# Patient Record
Sex: MALE | Race: WHITE | NOT HISPANIC OR LATINO | Employment: OTHER | URBAN - METROPOLITAN AREA
[De-identification: names, ages, dates, MRNs, and addresses within clinical notes are randomized per-mention and may not be internally consistent; named-entity substitution may affect disease eponyms.]

---

## 2023-05-09 ENCOUNTER — OFFICE VISIT (OUTPATIENT)
Dept: PRIMARY CARE | Facility: CLINIC | Age: 88
End: 2023-05-09
Payer: MEDICARE

## 2023-05-09 VITALS
BODY MASS INDEX: 30.18 KG/M2 | WEIGHT: 195.6 LBS | DIASTOLIC BLOOD PRESSURE: 60 MMHG | SYSTOLIC BLOOD PRESSURE: 118 MMHG | TEMPERATURE: 97.5 F

## 2023-05-09 DIAGNOSIS — I10 PRIMARY HYPERTENSION: ICD-10-CM

## 2023-05-09 DIAGNOSIS — I50.9 CHRONIC CONGESTIVE HEART FAILURE, UNSPECIFIED HEART FAILURE TYPE (MULTI): ICD-10-CM

## 2023-05-09 DIAGNOSIS — F33.42 RECURRENT MAJOR DEPRESSIVE DISORDER, IN FULL REMISSION (CMS-HCC): ICD-10-CM

## 2023-05-09 DIAGNOSIS — K21.9 GASTROESOPHAGEAL REFLUX DISEASE, UNSPECIFIED WHETHER ESOPHAGITIS PRESENT: ICD-10-CM

## 2023-05-09 DIAGNOSIS — I73.9 PAD (PERIPHERAL ARTERY DISEASE) (CMS-HCC): ICD-10-CM

## 2023-05-09 DIAGNOSIS — N18.31 STAGE 3A CHRONIC KIDNEY DISEASE (MULTI): ICD-10-CM

## 2023-05-09 DIAGNOSIS — M35.01 KERATITIS SICCA, BILATERAL (MULTI): ICD-10-CM

## 2023-05-09 DIAGNOSIS — J44.9: ICD-10-CM

## 2023-05-09 DIAGNOSIS — E11.59 CONTROLLED TYPE 2 DIABETES MELLITUS WITH OTHER CIRCULATORY COMPLICATION, WITHOUT LONG-TERM CURRENT USE OF INSULIN (MULTI): ICD-10-CM

## 2023-05-09 DIAGNOSIS — E11.9 TYPE 2 DIABETES MELLITUS WITHOUT COMPLICATION, WITHOUT LONG-TERM CURRENT USE OF INSULIN (MULTI): Primary | ICD-10-CM

## 2023-05-09 DIAGNOSIS — G95.9 CERVICAL MYELOPATHY (MULTI): ICD-10-CM

## 2023-05-09 PROBLEM — I35.0 NONRHEUMATIC AORTIC VALVE STENOSIS: Status: ACTIVE | Noted: 2023-05-09

## 2023-05-09 PROBLEM — F33.9 RECURRENT MAJOR DEPRESSIVE DISORDER (CMS-HCC): Status: ACTIVE | Noted: 2023-05-09

## 2023-05-09 PROBLEM — J41.8 MIXED SIMPLE AND MUCOPURULENT CHRONIC BRONCHITIS (MULTI): Status: ACTIVE | Noted: 2018-05-03

## 2023-05-09 PROBLEM — H16.223 KERATITIS SICCA, BILATERAL: Status: ACTIVE | Noted: 2023-05-09

## 2023-05-09 PROBLEM — N40.0 BENIGN PROSTATIC HYPERPLASIA: Status: ACTIVE | Noted: 2023-05-09

## 2023-05-09 PROBLEM — I65.23 BILATERAL CAROTID ARTERY STENOSIS: Status: ACTIVE | Noted: 2023-05-09

## 2023-05-09 PROBLEM — J45.909 REACTIVE AIRWAY DISEASE (HHS-HCC): Status: ACTIVE | Noted: 2023-05-09

## 2023-05-09 PROBLEM — D64.9 ANEMIA: Status: ACTIVE | Noted: 2023-05-09

## 2023-05-09 PROBLEM — N18.30 CKD (CHRONIC KIDNEY DISEASE) STAGE 3, GFR 30-59 ML/MIN (MULTI): Status: ACTIVE | Noted: 2023-05-09

## 2023-05-09 PROBLEM — M54.81 BILATERAL OCCIPITAL NEURALGIA: Status: ACTIVE | Noted: 2023-05-09

## 2023-05-09 PROCEDURE — 3078F DIAST BP <80 MM HG: CPT | Performed by: INTERNAL MEDICINE

## 2023-05-09 PROCEDURE — 3074F SYST BP LT 130 MM HG: CPT | Performed by: INTERNAL MEDICINE

## 2023-05-09 PROCEDURE — 99214 OFFICE O/P EST MOD 30 MIN: CPT | Performed by: INTERNAL MEDICINE

## 2023-05-09 PROCEDURE — 1157F ADVNC CARE PLAN IN RCRD: CPT | Performed by: INTERNAL MEDICINE

## 2023-05-09 PROCEDURE — 1036F TOBACCO NON-USER: CPT | Performed by: INTERNAL MEDICINE

## 2023-05-09 PROCEDURE — 1159F MED LIST DOCD IN RCRD: CPT | Performed by: INTERNAL MEDICINE

## 2023-05-09 RX ORDER — FAMOTIDINE 20 MG/1
20 TABLET, FILM COATED ORAL 2 TIMES DAILY
COMMUNITY
End: 2023-05-09 | Stop reason: SDUPTHER

## 2023-05-09 RX ORDER — SPIRONOLACTONE 25 MG/1
25 TABLET ORAL DAILY
COMMUNITY
End: 2023-05-09 | Stop reason: SDUPTHER

## 2023-05-09 RX ORDER — METOPROLOL TARTRATE 25 MG/1
1 TABLET, FILM COATED ORAL EVERY 12 HOURS
COMMUNITY
Start: 2019-04-24 | End: 2024-03-15

## 2023-05-09 RX ORDER — SPIRONOLACTONE 25 MG/1
25 TABLET ORAL DAILY
Qty: 90 TABLET | Refills: 3 | Status: SHIPPED | OUTPATIENT
Start: 2023-05-09 | End: 2024-03-19 | Stop reason: SDUPTHER

## 2023-05-09 RX ORDER — ESCITALOPRAM OXALATE 10 MG/1
10 TABLET ORAL DAILY
COMMUNITY
End: 2023-05-09 | Stop reason: SDUPTHER

## 2023-05-09 RX ORDER — ACETAMINOPHEN 325 MG/1
1-2 TABLET ORAL EVERY 6 HOURS
COMMUNITY
Start: 2019-11-09

## 2023-05-09 RX ORDER — ATORVASTATIN CALCIUM 80 MG/1
80 TABLET, FILM COATED ORAL NIGHTLY
COMMUNITY
End: 2023-05-09 | Stop reason: SDUPTHER

## 2023-05-09 RX ORDER — FAMOTIDINE 20 MG/1
20 TABLET, FILM COATED ORAL 2 TIMES DAILY
Qty: 180 TABLET | Refills: 3 | Status: SHIPPED | OUTPATIENT
Start: 2023-05-09 | End: 2024-02-06 | Stop reason: SDUPTHER

## 2023-05-09 RX ORDER — ALBUTEROL SULFATE 90 UG/1
2 AEROSOL, METERED RESPIRATORY (INHALATION) EVERY 4 HOURS PRN
COMMUNITY
Start: 2018-05-08 | End: 2024-04-02 | Stop reason: ALTCHOICE

## 2023-05-09 RX ORDER — GABAPENTIN 300 MG/1
300 CAPSULE ORAL 2 TIMES DAILY
Qty: 180 CAPSULE | Refills: 3 | Status: SHIPPED | OUTPATIENT
Start: 2023-05-09 | End: 2023-10-03 | Stop reason: SDUPTHER

## 2023-05-09 RX ORDER — ESCITALOPRAM OXALATE 10 MG/1
10 TABLET ORAL DAILY
Qty: 90 TABLET | Refills: 3 | Status: SHIPPED | OUTPATIENT
Start: 2023-05-09 | End: 2023-10-03 | Stop reason: SDUPTHER

## 2023-05-09 RX ORDER — FUROSEMIDE 40 MG/1
40 TABLET ORAL
COMMUNITY

## 2023-05-09 RX ORDER — GUAIFENESIN, PSEUDOEPHEDRINE HYDROCHLORIDE 600; 60 MG/1; MG/1
TABLET, EXTENDED RELEASE ORAL EVERY 12 HOURS
COMMUNITY
Start: 2020-09-01

## 2023-05-09 RX ORDER — MONTELUKAST SODIUM 10 MG/1
10 TABLET ORAL DAILY
COMMUNITY

## 2023-05-09 RX ORDER — ISOSORBIDE MONONITRATE 60 MG/1
1 TABLET, EXTENDED RELEASE ORAL
COMMUNITY
Start: 2019-06-14 | End: 2024-03-15

## 2023-05-09 RX ORDER — ALBUTEROL SULFATE 0.83 MG/ML
2.5 SOLUTION RESPIRATORY (INHALATION) DAILY
COMMUNITY
End: 2024-04-02 | Stop reason: ALTCHOICE

## 2023-05-09 RX ORDER — ATORVASTATIN CALCIUM 80 MG/1
80 TABLET, FILM COATED ORAL NIGHTLY
Qty: 90 TABLET | Refills: 3 | Status: SHIPPED | OUTPATIENT
Start: 2023-05-09 | End: 2024-03-19 | Stop reason: SDUPTHER

## 2023-05-09 RX ORDER — GABAPENTIN 300 MG/1
300 CAPSULE ORAL 2 TIMES DAILY
COMMUNITY
End: 2023-05-09 | Stop reason: SDUPTHER

## 2023-05-09 RX ORDER — ASPIRIN 81 MG/1
1 TABLET ORAL DAILY
COMMUNITY

## 2023-05-09 RX ORDER — TIOTROPIUM BROMIDE 18 UG/1
CAPSULE ORAL; RESPIRATORY (INHALATION)
COMMUNITY

## 2023-05-09 ASSESSMENT — PATIENT HEALTH QUESTIONNAIRE - PHQ9
SUM OF ALL RESPONSES TO PHQ9 QUESTIONS 1 AND 2: 2
2. FEELING DOWN, DEPRESSED OR HOPELESS: SEVERAL DAYS
1. LITTLE INTEREST OR PLEASURE IN DOING THINGS: SEVERAL DAYS

## 2023-05-09 NOTE — PROGRESS NOTES
"Subjective   Patient ID: Gregory Tai is a 88 y.o. male who presents for Follow-up.    HPI   Overall well   #1 lt arm pain- minimal now  #2 DM- diet \"ok\"  #3 BPH- ok UO  #4 CAD- no CP, recently saw cards  #5 COPD/chronic bronchitis/LARON-  #6 depression- good      Review of Systems   All other systems reviewed and are negative.      Objective   /60   Temp 36.4 °C (97.5 °F)   Wt 88.7 kg (195 lb 9.6 oz)   BMI 30.18 kg/m²     Physical Exam  Constitutional:       General: He is not in acute distress.     Appearance: Normal appearance. He is not ill-appearing or toxic-appearing.   Cardiovascular:      Rate and Rhythm: Normal rate and regular rhythm.      Heart sounds: No murmur heard.  Pulmonary:      Effort: Pulmonary effort is normal.      Breath sounds: Normal breath sounds.   Abdominal:      General: Abdomen is flat.      Palpations: Abdomen is soft. There is no mass.      Tenderness: There is no abdominal tenderness.   Neurological:      Mental Status: He is alert and oriented to person, place, and time.   Psychiatric:         Mood and Affect: Mood normal.         Thought Content: Thought content normal.         Judgment: Judgment normal.           Lab Results   Component Value Date    WBC 6.6 11/14/2022    HGB 12.6 (L) 11/14/2022    HCT 40.6 (L) 11/14/2022     11/14/2022    CHOL 102 05/13/2022    TRIG 50 05/13/2022    HDL 46.1 05/13/2022    ALT 23 05/13/2022    AST 22 03/15/2022     11/14/2022    K 4.3 11/14/2022     11/14/2022    CREATININE 1.27 11/14/2022    BUN 28 (H) 11/14/2022    CO2 33 (H) 11/14/2022    TSH 1.11 11/14/2022    INR 1.0 11/06/2019    HGBA1C 6.5 (A) 01/18/2023     par  Assessment/Plan       #1 lt arm pain/ lt biceps tendon rupture- resolved. f/u ortho prn  #2 DM- stable. Spent significant time reviewing low sugar, reduced carbohydrate diet with exercise. retest, will hold off rx as a1c <7.5 (goal)  #3 BPH- stable. Followup urology  #4 CAD-stable class I. Follow-up " cardiology   #5 COPD/chronic bronchitis/LARON- remains an issue. f/u pulm ([facility]) /infectious disease ([facility])--> appt pending today.   #6 depression- stable. will try to reduce lexapro, he declines.   #7 hyperlipidemia- retest 3 mths  #8 carotid disease - f/u vasc surg [facility]  #9 pulmonary nodule- stable x2 yrs. f/u CT w/ pulm ([facility])  #10 abd wall pain- resolved.  #11 htn- good. 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 f/u surgery.  #13 anemia/mild thrombocytopenia- stable/mild. s/p heme eval--> iron def. Off iron for now.   heme states no follow-up needed except follow-up lab works. retest   #14 cough- stable, but remains a major issue, perhaps a little better. Apparently chronic bronchitis. Follow pulmonology  #15 headache- stable. f/u neuro  #16 back pain/neuropathic pain- s/p epidurals. f/u neuro. strongly rec PT.  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  #19 GB dz- doing well  #20 esophageal spasm/web- Follow-up GI  #21 CTS- reviewed. f/u hand ortho.   #22 CKD- f/u Dr Lucio (neph).   #23 ? sinus issue/smell - resolved  #24 hyponatremia- apparently due to Trileptal plus likely combination of pulmonary disease/CHF. follow-up w/ dr lucio.  #25 pneumonia- resolved. f/u pulm  #26 CHF w/ preserved EF- stable. daily wt's. f/u cards.  #27 CTS- surg pending (rt)  .  rec COVID vaccine ASAP, stressed importance

## 2023-06-30 LAB
ALBUMIN (G/DL) IN SER/PLAS: 3.8 G/DL (ref 3.4–5)
ALBUMIN (MG/L) IN URINE: <7 MG/L
ALBUMIN/CREATININE (UG/MG) IN URINE: NORMAL UG/MG CRT (ref 0–30)
ANION GAP IN SER/PLAS: 10 MMOL/L (ref 10–20)
APPEARANCE, URINE: ABNORMAL
BILIRUBIN, URINE: NEGATIVE
BLOOD, URINE: NEGATIVE
CALCIUM (MG/DL) IN SER/PLAS: 9 MG/DL (ref 8.6–10.3)
CARBON DIOXIDE, TOTAL (MMOL/L) IN SER/PLAS: 31 MMOL/L (ref 21–32)
CHLORIDE (MMOL/L) IN SER/PLAS: 105 MMOL/L (ref 98–107)
COLOR, URINE: YELLOW
CREATININE (MG/DL) IN SER/PLAS: 1.3 MG/DL (ref 0.5–1.3)
CREATININE (MG/DL) IN URINE: 66.9 MG/DL (ref 20–370)
GFR MALE: 53 ML/MIN/1.73M2
GLUCOSE (MG/DL) IN SER/PLAS: 97 MG/DL (ref 74–99)
GLUCOSE, URINE: NEGATIVE MG/DL
HYALINE CASTS, URINE: ABNORMAL /LPF
KETONES, URINE: NEGATIVE MG/DL
LEUKOCYTE ESTERASE, URINE: ABNORMAL
MUCUS, URINE: ABNORMAL /LPF
NITRITE, URINE: NEGATIVE
PH, URINE: 5 (ref 5–8)
PHOSPHATE (MG/DL) IN SER/PLAS: 3.5 MG/DL (ref 2.5–4.9)
POTASSIUM (MMOL/L) IN SER/PLAS: 4 MMOL/L (ref 3.5–5.3)
PROTEIN, URINE: NEGATIVE MG/DL
RBC, URINE: 1 /HPF (ref 0–5)
SODIUM (MMOL/L) IN SER/PLAS: 142 MMOL/L (ref 136–145)
SPECIFIC GRAVITY, URINE: 1.01 (ref 1–1.03)
UREA NITROGEN (MG/DL) IN SER/PLAS: 33 MG/DL (ref 6–23)
UROBILINOGEN, URINE: <2 MG/DL (ref 0–1.9)
WBC CLUMPS, URINE: ABNORMAL /HPF
WBC, URINE: 80 /HPF (ref 0–5)

## 2023-07-27 LAB
ALBUMIN (G/DL) IN SER/PLAS: 3.7 G/DL (ref 3.4–5)
ALBUMIN (MG/L) IN URINE: 7.6 MG/L
ALBUMIN/CREATININE (UG/MG) IN URINE: 7.9 UG/MG CRT (ref 0–30)
ANION GAP IN SER/PLAS: 10 MMOL/L (ref 10–20)
APPEARANCE, URINE: ABNORMAL
BACTERIA, URINE: ABNORMAL /HPF
BILIRUBIN, URINE: NEGATIVE
BLOOD, URINE: NEGATIVE
CALCIDIOL (25 OH VITAMIN D3) (NG/ML) IN SER/PLAS: 48 NG/ML
CALCIUM (MG/DL) IN SER/PLAS: 8.9 MG/DL (ref 8.6–10.3)
CARBON DIOXIDE, TOTAL (MMOL/L) IN SER/PLAS: 31 MMOL/L (ref 21–32)
CHLORIDE (MMOL/L) IN SER/PLAS: 103 MMOL/L (ref 98–107)
COLOR, URINE: ABNORMAL
CREATININE (MG/DL) IN SER/PLAS: 1.36 MG/DL (ref 0.5–1.3)
CREATININE (MG/DL) IN URINE: 96.8 MG/DL (ref 20–370)
ERYTHROCYTE DISTRIBUTION WIDTH (RATIO) BY AUTOMATED COUNT: 12.6 % (ref 11.5–14.5)
ERYTHROCYTE MEAN CORPUSCULAR HEMOGLOBIN CONCENTRATION (G/DL) BY AUTOMATED: 31.5 G/DL (ref 32–36)
ERYTHROCYTE MEAN CORPUSCULAR VOLUME (FL) BY AUTOMATED COUNT: 96 FL (ref 80–100)
ERYTHROCYTES (10*6/UL) IN BLOOD BY AUTOMATED COUNT: 4.21 X10E12/L (ref 4.5–5.9)
GFR MALE: 51 ML/MIN/1.73M2
GLUCOSE (MG/DL) IN SER/PLAS: 151 MG/DL (ref 74–99)
GLUCOSE, URINE: NEGATIVE MG/DL
HEMATOCRIT (%) IN BLOOD BY AUTOMATED COUNT: 40.3 % (ref 41–52)
HEMOGLOBIN (G/DL) IN BLOOD: 12.7 G/DL (ref 13.5–17.5)
HYALINE CASTS, URINE: ABNORMAL /LPF
KETONES, URINE: NEGATIVE MG/DL
LEUKOCYTE ESTERASE, URINE: ABNORMAL
LEUKOCYTES (10*3/UL) IN BLOOD BY AUTOMATED COUNT: 6.1 X10E9/L (ref 4.4–11.3)
NITRITE, URINE: NEGATIVE
PH, URINE: 5 (ref 5–8)
PHOSPHATE (MG/DL) IN SER/PLAS: 3.1 MG/DL (ref 2.5–4.9)
PLATELETS (10*3/UL) IN BLOOD AUTOMATED COUNT: 174 X10E9/L (ref 150–450)
POTASSIUM (MMOL/L) IN SER/PLAS: 4.1 MMOL/L (ref 3.5–5.3)
PROTEIN, URINE: NEGATIVE MG/DL
RBC, URINE: 1 /HPF (ref 0–5)
SODIUM (MMOL/L) IN SER/PLAS: 140 MMOL/L (ref 136–145)
SPECIFIC GRAVITY, URINE: 1.02 (ref 1–1.03)
SQUAMOUS EPITHELIAL CELLS, URINE: 1 /HPF
UREA NITROGEN (MG/DL) IN SER/PLAS: 30 MG/DL (ref 6–23)
UROBILINOGEN, URINE: <2 MG/DL (ref 0–1.9)
WBC CLUMPS, URINE: ABNORMAL /HPF
WBC, URINE: >182 /HPF (ref 0–5)

## 2023-07-28 LAB — PARATHYRIN INTACT (PG/ML) IN SER/PLAS: 103.6 PG/ML (ref 18.5–88)

## 2023-09-01 DIAGNOSIS — I50.9 CHRONIC CONGESTIVE HEART FAILURE, UNSPECIFIED HEART FAILURE TYPE (MULTI): ICD-10-CM

## 2023-09-01 DIAGNOSIS — I73.9 PAD (PERIPHERAL ARTERY DISEASE) (CMS-HCC): ICD-10-CM

## 2023-09-13 PROBLEM — R13.19 ESOPHAGEAL DYSPHAGIA: Status: ACTIVE | Noted: 2018-07-05

## 2023-09-13 PROBLEM — H04.123 BILATERAL DRY EYES: Status: ACTIVE | Noted: 2023-09-13

## 2023-09-13 PROBLEM — H61.893 EAR CANAL DRYNESS, BILATERAL: Status: ACTIVE | Noted: 2023-09-13

## 2023-09-13 PROBLEM — R43.9 SMELL DISORDER: Status: ACTIVE | Noted: 2023-09-13

## 2023-09-13 PROBLEM — R73.01 IMPAIRED FASTING BLOOD SUGAR: Status: ACTIVE | Noted: 2023-09-13

## 2023-09-13 PROBLEM — K80.10 GALLBLADDER STONE WITH NONACUTE CHOLECYSTITIS: Status: ACTIVE | Noted: 2023-09-13

## 2023-09-13 PROBLEM — G62.9 NEUROPATHY: Chronic | Status: ACTIVE | Noted: 2017-12-09

## 2023-09-13 PROBLEM — R39.15 URGENCY OF URINATION: Status: ACTIVE | Noted: 2018-04-04

## 2023-09-13 PROBLEM — Z95.5 HISTORY OF CORONARY ARTERY STENT PLACEMENT: Status: ACTIVE | Noted: 2023-09-13

## 2023-09-13 PROBLEM — B35.6 TINEA CRURIS: Status: ACTIVE | Noted: 2023-09-13

## 2023-09-13 PROBLEM — F41.9 ANXIETY: Status: ACTIVE | Noted: 2017-12-09

## 2023-09-13 PROBLEM — I73.9 PERIPHERAL VASCULAR DISEASE (CMS-HCC): Status: ACTIVE | Noted: 2017-06-15

## 2023-09-13 PROBLEM — E66.9 OBESITY, CLASS I, BMI 30-34.9: Status: ACTIVE | Noted: 2018-11-06

## 2023-09-13 PROBLEM — G25.81 RESTLESS LEGS SYNDROME: Status: ACTIVE | Noted: 2023-09-13

## 2023-09-13 PROBLEM — R05.3 CHRONIC COUGH: Status: ACTIVE | Noted: 2023-09-13

## 2023-09-13 PROBLEM — I70.211 ATHEROSCLER OF NATIVE ARTERY OF RIGHT LEG WITH INTERMIT CLAUDICATION (CMS-HCC): Status: ACTIVE | Noted: 2023-09-13

## 2023-09-13 PROBLEM — N39.46 MIXED INCONTINENCE: Status: ACTIVE | Noted: 2018-04-04

## 2023-09-13 PROBLEM — Q39.4 ESOPHAGEAL WEB (HHS-HCC): Status: ACTIVE | Noted: 2023-09-13

## 2023-09-13 PROBLEM — K22.4 DIFFUSE ESOPHAGEAL SPASM: Status: ACTIVE | Noted: 2023-09-13

## 2023-09-13 PROBLEM — I70.209 ATHEROSCLEROTIC PERIPHERAL VASCULAR DISEASE (CMS-HCC): Status: ACTIVE | Noted: 2017-06-15

## 2023-09-13 PROBLEM — I78.1 HEMANGIOMA, CAPILLARY: Status: ACTIVE | Noted: 2023-09-13

## 2023-09-13 PROBLEM — G95.89 CERVICAL CORD MYELOMALACIA (MULTI): Status: ACTIVE | Noted: 2023-09-13

## 2023-09-13 PROBLEM — M96.1 CERVICAL POSTLAMINECTOMY SYNDROME: Status: ACTIVE | Noted: 2023-09-13

## 2023-09-13 PROBLEM — R91.1 LUNG NODULE: Status: ACTIVE | Noted: 2018-05-03

## 2023-09-13 PROBLEM — Z96.1 PSEUDOPHAKIA OF BOTH EYES: Status: ACTIVE | Noted: 2023-09-13

## 2023-09-13 PROBLEM — S06.0X1A CONCUSSION WITH BRIEF LOC: Status: ACTIVE | Noted: 2017-12-09

## 2023-09-13 PROBLEM — T17.908A ASPIRATION INTO AIRWAY: Status: ACTIVE | Noted: 2018-04-03

## 2023-09-13 PROBLEM — E87.1 HYPONATREMIA: Status: ACTIVE | Noted: 2023-09-13

## 2023-09-13 PROBLEM — A49.02 MRSA INFECTION: Status: ACTIVE | Noted: 2018-04-03

## 2023-09-13 PROBLEM — N48.1 BALANITIS: Status: ACTIVE | Noted: 2023-09-13

## 2023-09-13 PROBLEM — R60.0 EDEMA OF EXTREMITIES: Status: ACTIVE | Noted: 2023-09-13

## 2023-09-13 PROBLEM — K40.90 LEFT INGUINAL HERNIA: Status: ACTIVE | Noted: 2023-09-13

## 2023-09-13 PROBLEM — G47.31 CENTRAL SLEEP APNEA: Status: ACTIVE | Noted: 2023-09-13

## 2023-09-13 PROBLEM — R79.89 ELEVATED SERUM CREATININE: Status: ACTIVE | Noted: 2023-09-13

## 2023-09-13 PROBLEM — R26.89 IMPAIRMENT OF BALANCE: Status: ACTIVE | Noted: 2017-12-09

## 2023-09-13 PROBLEM — F32.A DEPRESSION: Status: ACTIVE | Noted: 2023-09-13

## 2023-09-13 PROBLEM — R00.2 HEART PALPITATIONS: Status: ACTIVE | Noted: 2023-09-13

## 2023-09-13 PROBLEM — E66.811 OBESITY, CLASS I, BMI 30-34.9: Status: ACTIVE | Noted: 2018-11-06

## 2023-09-13 PROBLEM — H35.371 EPIRETINAL MEMBRANE (ERM) OF RIGHT EYE: Status: ACTIVE | Noted: 2023-09-13

## 2023-09-13 PROBLEM — H90.3 BILATERAL SENSORINEURAL HEARING LOSS: Status: ACTIVE | Noted: 2023-09-13

## 2023-09-13 PROBLEM — R55 SYNCOPE: Status: ACTIVE | Noted: 2023-09-13

## 2023-09-13 PROBLEM — M48.061 LUMBAR STENOSIS: Status: ACTIVE | Noted: 2023-09-13

## 2023-09-13 PROBLEM — H93.293 AUDITORY DISCRIMINATION IMPAIRMENT, BILATERAL: Status: ACTIVE | Noted: 2023-09-13

## 2023-09-13 PROBLEM — M65.332 TRIGGER MIDDLE FINGER OF LEFT HAND: Status: ACTIVE | Noted: 2023-09-13

## 2023-09-13 PROBLEM — G56.02 LEFT CARPAL TUNNEL SYNDROME: Status: ACTIVE | Noted: 2023-09-13

## 2023-09-13 PROBLEM — M65.342 TRIGGER RING FINGER OF LEFT HAND: Status: ACTIVE | Noted: 2023-09-13

## 2023-09-13 PROBLEM — H35.30 AMD (AGE-RELATED MACULAR DEGENERATION), BILATERAL: Status: ACTIVE | Noted: 2023-09-13

## 2023-09-13 RX ORDER — TAMSULOSIN HYDROCHLORIDE 0.4 MG/1
CAPSULE ORAL
COMMUNITY
Start: 2019-08-28 | End: 2023-10-03 | Stop reason: ALTCHOICE

## 2023-09-13 RX ORDER — DOXYCYCLINE 100 MG/1
100 CAPSULE ORAL
COMMUNITY
End: 2023-10-03 | Stop reason: ALTCHOICE

## 2023-09-13 RX ORDER — CYCLOSPORINE 0.5 MG/ML
1 EMULSION OPHTHALMIC 2 TIMES DAILY
COMMUNITY

## 2023-09-13 RX ORDER — SODIUM CHLORIDE FOR INHALATION 7 %
4 VIAL, NEBULIZER (ML) INHALATION 2 TIMES DAILY
COMMUNITY
Start: 2020-02-26 | End: 2023-10-16 | Stop reason: ALTCHOICE

## 2023-09-13 RX ORDER — AMLODIPINE BESYLATE 2.5 MG/1
TABLET ORAL
COMMUNITY
Start: 2019-11-09 | End: 2023-10-03 | Stop reason: ALTCHOICE

## 2023-09-13 RX ORDER — MIRABEGRON 50 MG/1
50 TABLET, EXTENDED RELEASE ORAL DAILY
COMMUNITY
Start: 2018-04-04 | End: 2023-10-03 | Stop reason: ALTCHOICE

## 2023-09-13 RX ORDER — ALBUTEROL SULFATE 0.83 MG/ML
2.5 SOLUTION RESPIRATORY (INHALATION) 2 TIMES DAILY
COMMUNITY
Start: 2019-05-09 | End: 2023-10-16 | Stop reason: SDUPTHER

## 2023-09-13 RX ORDER — FLUTICASONE PROPIONATE AND SALMETEROL XINAFOATE 115; 21 UG/1; UG/1
2 AEROSOL, METERED RESPIRATORY (INHALATION) 2 TIMES DAILY
COMMUNITY
Start: 2020-02-25 | End: 2023-10-16 | Stop reason: ALTCHOICE

## 2023-09-13 RX ORDER — BENZONATATE 200 MG/1
200 CAPSULE ORAL 3 TIMES DAILY PRN
COMMUNITY
Start: 2023-05-16

## 2023-09-13 RX ORDER — BENZONATATE 100 MG/1
200 CAPSULE ORAL 3 TIMES DAILY
COMMUNITY
End: 2023-10-16 | Stop reason: ALTCHOICE

## 2023-09-13 RX ORDER — IPRATROPIUM BROMIDE AND ALBUTEROL SULFATE 2.5; .5 MG/3ML; MG/3ML
3 SOLUTION RESPIRATORY (INHALATION) EVERY 4 HOURS PRN
COMMUNITY
Start: 2023-05-17 | End: 2024-04-02 | Stop reason: ALTCHOICE

## 2023-09-27 ENCOUNTER — LAB (OUTPATIENT)
Dept: LAB | Facility: LAB | Age: 88
End: 2023-09-27
Payer: MEDICARE

## 2023-09-27 DIAGNOSIS — E11.9 TYPE 2 DIABETES MELLITUS WITHOUT COMPLICATION, WITHOUT LONG-TERM CURRENT USE OF INSULIN (MULTI): ICD-10-CM

## 2023-09-27 LAB
ALANINE AMINOTRANSFERASE (SGPT) (U/L) IN SER/PLAS: 32 U/L (ref 10–52)
ANION GAP IN SER/PLAS: 11 MMOL/L (ref 10–20)
CALCIUM (MG/DL) IN SER/PLAS: 8.9 MG/DL (ref 8.6–10.3)
CARBON DIOXIDE, TOTAL (MMOL/L) IN SER/PLAS: 34 MMOL/L (ref 21–32)
CHLORIDE (MMOL/L) IN SER/PLAS: 101 MMOL/L (ref 98–107)
CHOLESTEROL (MG/DL) IN SER/PLAS: 106 MG/DL (ref 0–199)
CHOLESTEROL IN HDL (MG/DL) IN SER/PLAS: 44.8 MG/DL
CHOLESTEROL/HDL RATIO: 2.4
CREATININE (MG/DL) IN SER/PLAS: 1.36 MG/DL (ref 0.5–1.3)
ERYTHROCYTE DISTRIBUTION WIDTH (RATIO) BY AUTOMATED COUNT: 12.5 % (ref 11.5–14.5)
ERYTHROCYTE MEAN CORPUSCULAR HEMOGLOBIN CONCENTRATION (G/DL) BY AUTOMATED: 31.8 G/DL (ref 32–36)
ERYTHROCYTE MEAN CORPUSCULAR VOLUME (FL) BY AUTOMATED COUNT: 96 FL (ref 80–100)
ERYTHROCYTES (10*6/UL) IN BLOOD BY AUTOMATED COUNT: 4.17 X10E12/L (ref 4.5–5.9)
GFR MALE: 50 ML/MIN/1.73M2
GLUCOSE (MG/DL) IN SER/PLAS: 81 MG/DL (ref 74–99)
HEMATOCRIT (%) IN BLOOD BY AUTOMATED COUNT: 40 % (ref 41–52)
HEMOGLOBIN (G/DL) IN BLOOD: 12.7 G/DL (ref 13.5–17.5)
LDL: 46 MG/DL (ref 0–99)
LEUKOCYTES (10*3/UL) IN BLOOD BY AUTOMATED COUNT: 5.8 X10E9/L (ref 4.4–11.3)
PLATELETS (10*3/UL) IN BLOOD AUTOMATED COUNT: 163 X10E9/L (ref 150–450)
POTASSIUM (MMOL/L) IN SER/PLAS: 4.8 MMOL/L (ref 3.5–5.3)
SODIUM (MMOL/L) IN SER/PLAS: 141 MMOL/L (ref 136–145)
TRIGLYCERIDE (MG/DL) IN SER/PLAS: 78 MG/DL (ref 0–149)
UREA NITROGEN (MG/DL) IN SER/PLAS: 29 MG/DL (ref 6–23)
VLDL: 16 MG/DL (ref 0–40)

## 2023-09-27 PROCEDURE — 85027 COMPLETE CBC AUTOMATED: CPT

## 2023-09-27 PROCEDURE — 36415 COLL VENOUS BLD VENIPUNCTURE: CPT

## 2023-09-27 PROCEDURE — 84460 ALANINE AMINO (ALT) (SGPT): CPT

## 2023-09-27 PROCEDURE — 80048 BASIC METABOLIC PNL TOTAL CA: CPT

## 2023-09-27 PROCEDURE — 80061 LIPID PANEL: CPT

## 2023-09-27 PROCEDURE — 83036 HEMOGLOBIN GLYCOSYLATED A1C: CPT

## 2023-09-28 LAB
ESTIMATED AVERAGE GLUCOSE FOR HBA1C: 143 MG/DL
HEMOGLOBIN A1C/HEMOGLOBIN TOTAL IN BLOOD: 6.6 %

## 2023-10-03 ENCOUNTER — OFFICE VISIT (OUTPATIENT)
Dept: PRIMARY CARE | Facility: CLINIC | Age: 88
End: 2023-10-03
Payer: MEDICARE

## 2023-10-03 VITALS
WEIGHT: 189.4 LBS | DIASTOLIC BLOOD PRESSURE: 62 MMHG | SYSTOLIC BLOOD PRESSURE: 110 MMHG | HEIGHT: 68 IN | BODY MASS INDEX: 28.7 KG/M2

## 2023-10-03 DIAGNOSIS — Z23 ENCOUNTER FOR IMMUNIZATION: ICD-10-CM

## 2023-10-03 DIAGNOSIS — G95.9 CERVICAL MYELOPATHY (MULTI): ICD-10-CM

## 2023-10-03 DIAGNOSIS — D69.2 SENILE PURPURA (CMS-HCC): ICD-10-CM

## 2023-10-03 DIAGNOSIS — Z00.00 ROUTINE CHECK-UP: Primary | ICD-10-CM

## 2023-10-03 DIAGNOSIS — I73.9 PAD (PERIPHERAL ARTERY DISEASE) (CMS-HCC): ICD-10-CM

## 2023-10-03 DIAGNOSIS — F33.42 RECURRENT MAJOR DEPRESSIVE DISORDER, IN FULL REMISSION (CMS-HCC): ICD-10-CM

## 2023-10-03 DIAGNOSIS — I25.119 ATHEROSCLEROSIS OF NATIVE CORONARY ARTERY OF NATIVE HEART WITH ANGINA PECTORIS (CMS-HCC): ICD-10-CM

## 2023-10-03 PROCEDURE — G0008 ADMIN INFLUENZA VIRUS VAC: HCPCS | Performed by: INTERNAL MEDICINE

## 2023-10-03 PROCEDURE — 3074F SYST BP LT 130 MM HG: CPT | Performed by: INTERNAL MEDICINE

## 2023-10-03 PROCEDURE — 1170F FXNL STATUS ASSESSED: CPT | Performed by: INTERNAL MEDICINE

## 2023-10-03 PROCEDURE — 1160F RVW MEDS BY RX/DR IN RCRD: CPT | Performed by: INTERNAL MEDICINE

## 2023-10-03 PROCEDURE — 1036F TOBACCO NON-USER: CPT | Performed by: INTERNAL MEDICINE

## 2023-10-03 PROCEDURE — 1159F MED LIST DOCD IN RCRD: CPT | Performed by: INTERNAL MEDICINE

## 2023-10-03 PROCEDURE — 90662 IIV NO PRSV INCREASED AG IM: CPT | Performed by: INTERNAL MEDICINE

## 2023-10-03 PROCEDURE — 99213 OFFICE O/P EST LOW 20 MIN: CPT | Performed by: INTERNAL MEDICINE

## 2023-10-03 PROCEDURE — 3078F DIAST BP <80 MM HG: CPT | Performed by: INTERNAL MEDICINE

## 2023-10-03 PROCEDURE — G0439 PPPS, SUBSEQ VISIT: HCPCS | Performed by: INTERNAL MEDICINE

## 2023-10-03 RX ORDER — GABAPENTIN 300 MG/1
300 CAPSULE ORAL 2 TIMES DAILY
Qty: 180 CAPSULE | Refills: 3 | Status: SHIPPED | OUTPATIENT
Start: 2023-10-03 | End: 2024-10-02

## 2023-10-03 RX ORDER — ESCITALOPRAM OXALATE 10 MG/1
10 TABLET ORAL DAILY
Qty: 90 TABLET | Refills: 3 | Status: SHIPPED | OUTPATIENT
Start: 2023-10-03 | End: 2024-10-02

## 2023-10-03 ASSESSMENT — ACTIVITIES OF DAILY LIVING (ADL)
DOING_HOUSEWORK: INDEPENDENT
GROCERY_SHOPPING: INDEPENDENT
MANAGING_FINANCES: INDEPENDENT
TAKING_MEDICATION: INDEPENDENT
BATHING: INDEPENDENT
DRESSING: INDEPENDENT

## 2023-10-03 ASSESSMENT — PATIENT HEALTH QUESTIONNAIRE - PHQ9
2. FEELING DOWN, DEPRESSED OR HOPELESS: NOT AT ALL
2. FEELING DOWN, DEPRESSED OR HOPELESS: SEVERAL DAYS
SUM OF ALL RESPONSES TO PHQ9 QUESTIONS 1 AND 2: 0
1. LITTLE INTEREST OR PLEASURE IN DOING THINGS: NOT AT ALL
1. LITTLE INTEREST OR PLEASURE IN DOING THINGS: SEVERAL DAYS
SUM OF ALL RESPONSES TO PHQ9 QUESTIONS 1 AND 2: 2

## 2023-10-03 NOTE — PROGRESS NOTES
"Subjective   Reason for Visit: Gregory Tai is an 88 y.o. male here for a Medicare Wellness visit.          Reviewed all medications by prescribing practitioner or clinical pharmacist (such as prescriptions, OTCs, herbal therapies and supplements) and documented in the medical record.    HPI  Overall well   Ongoing issue w/ pain, rt leg.  Likely claudication +/- neurogenic.  Working w/ vasc and pain med  #1 lt arm pain- remains an issue, increased x 1-2 weeks.  Constant.   #2 DM- diet \"ok\", little exercise  #3 BPH- ok UO.  Minimal nocturia  #4 CAD- no CP   #5 COPD/chronic bronchitis/LARON- no sig change  #6 depression- good    Patient Care Team:  Suellen Waite MD as PCP - General  Suellen Waite MD as PCP - [facility] Attributed Provider     Review of Systems   All other systems reviewed and are negative.      Objective   Vitals:  /62   Ht 1.715 m (5' 7.5\")   Wt 85.9 kg (189 lb 6.4 oz)   BMI 29.23 kg/m²       Physical Exam  Constitutional:       Appearance: Normal appearance.   Neurological:      Mental Status: He is alert.       Lab Results   Component Value Date    WBC 5.8 09/27/2023    HGB 12.7 (L) 09/27/2023    HCT 40.0 (L) 09/27/2023     09/27/2023    CHOL 106 09/27/2023    TRIG 78 09/27/2023    HDL 44.8 09/27/2023    ALT 32 09/27/2023    AST 22 03/15/2022     09/27/2023    K 4.8 09/27/2023     09/27/2023    CREATININE 1.36 (H) 09/27/2023    BUN 29 (H) 09/27/2023    CO2 34 (H) 09/27/2023    TSH 1.11 11/14/2022    INR 1.0 11/06/2019    HGBA1C 6.6 (A) 09/27/2023      Assessment/Plan   Problem List Items Addressed This Visit       Cervical myelopathy (CMS/HCC)    Recurrent major depressive disorder (CMS/HCC)     Other Visit Diagnoses       Encounter for immunization            #1 lt arm pain/ lt biceps tendon rupture- resolved. f/u ortho prn  #2 DM- stable. Spent significant time reviewing low sugar, reduced carbohydrate diet with exercise. Retest 4 mths, will hold off rx as a1c <7.5 " (goal)  #3 BPH- stable. Followup urology  #4 CAD-stable class I. Follow-up cardiology   #5 COPD/chronic bronchitis/LARON- remains an issue. f/u pulm ([facility]) /infectious disease ([facility])--> appt pending today.   #6 depression- stable. will try to reduce lexapro, he declines.   #7 hyperlipidemia- good  #8 carotid disease - f/u [facility]asc surg [facility]  #9 pulmonary nodule- stable x2 yrs. f/u CT w/ pulm ([facility])  #10 abd wall pain- resolved.  #11 htn- good. 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 f/u surgery.  #13 anemia/mild thrombocytopenia- stable/mild. s/p heme eval--> iron def. Off iron for now.   heme states no follow-up needed except follow-up lab works. retest   #14 cough- stable, but remains a major issue, perhaps a little better. Apparently chronic bronchitis. Follow pulmonology  #15 headache- stable. f/u neuro  #16 back pain/neuropathic pain- s/p epidurals. f/u neuro. strongly rec PT.  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  #19 GB dz- doing well  #20 esophageal spasm/web- Follow-up GI  #21 CTS- reviewed. f/u hand ortho.   #22 CKD- f/u Dr Lucio (neph).   #23 ? sinus issue/smell - resolved  #24 hyponatremia- apparently due to Trileptal plus likely combination of pulmonary disease/CHF. follow-up w/ dr lucio.  #25 pneumonia- resolved. f/u pulm  #26 CHF w/ preserved EF- stable. daily wt's. f/u cards.  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 Risk prevention instructions provided.

## 2023-10-03 NOTE — PATIENT INSTRUCTIONS

## 2023-10-04 ASSESSMENT — ACTIVITIES OF DAILY LIVING (ADL)
MANAGING_FINANCES: INDEPENDENT
GROCERY_SHOPPING: INDEPENDENT
BATHING: INDEPENDENT
DOING_HOUSEWORK: INDEPENDENT
DRESSING: INDEPENDENT
TAKING_MEDICATION: INDEPENDENT

## 2023-10-04 ASSESSMENT — PATIENT HEALTH QUESTIONNAIRE - PHQ9
SUM OF ALL RESPONSES TO PHQ9 QUESTIONS 1 AND 2: 0
1. LITTLE INTEREST OR PLEASURE IN DOING THINGS: NOT AT ALL
2. FEELING DOWN, DEPRESSED OR HOPELESS: NOT AT ALL

## 2023-10-16 ENCOUNTER — OFFICE VISIT (OUTPATIENT)
Dept: VASCULAR SURGERY | Facility: HOSPITAL | Age: 88
End: 2023-10-16
Payer: MEDICARE

## 2023-10-16 VITALS
HEIGHT: 68 IN | OXYGEN SATURATION: 98 % | DIASTOLIC BLOOD PRESSURE: 51 MMHG | WEIGHT: 190 LBS | BODY MASS INDEX: 28.79 KG/M2 | SYSTOLIC BLOOD PRESSURE: 114 MMHG | HEART RATE: 65 BPM

## 2023-10-16 DIAGNOSIS — I73.9 PVD (PERIPHERAL VASCULAR DISEASE) WITH CLAUDICATION (CMS-HCC): Primary | ICD-10-CM

## 2023-10-16 PROCEDURE — 99214 OFFICE O/P EST MOD 30 MIN: CPT | Performed by: SURGERY

## 2023-10-16 PROCEDURE — 3074F SYST BP LT 130 MM HG: CPT | Performed by: SURGERY

## 2023-10-16 PROCEDURE — 1036F TOBACCO NON-USER: CPT | Performed by: SURGERY

## 2023-10-16 PROCEDURE — 1160F RVW MEDS BY RX/DR IN RCRD: CPT | Performed by: SURGERY

## 2023-10-16 PROCEDURE — 3078F DIAST BP <80 MM HG: CPT | Performed by: SURGERY

## 2023-10-16 PROCEDURE — 1159F MED LIST DOCD IN RCRD: CPT | Performed by: SURGERY

## 2023-10-16 ASSESSMENT — ENCOUNTER SYMPTOMS
LOSS OF SENSATION IN FEET: 0
OCCASIONAL FEELINGS OF UNSTEADINESS: 0
DEPRESSION: 0

## 2023-10-16 NOTE — PROGRESS NOTES
Vascular Surgery Consult/Clinic Note    CC: Follow up    HPI:  Gregory Tai is 88 y.o. male with PMH of CAD, AS, HTN, HLD and BLE claudication (R>L) who is here for a follow up visit after cardiology visit. He has remote history of neurologic event where he gained most of the strength for LLE but RLE is weaker. He reports his sx. has remained unchanged. He denies any rest pain.     Meds:   Current Outpatient Medications on File Prior to Visit   Medication Sig Dispense Refill    acetaminophen (Tylenol) 325 mg tablet Take 1-2 tablets (325-650 mg) by mouth every 6 hours.      albuterol 2.5 mg /3 mL (0.083 %) nebulizer solution Inhale.      aspirin 81 mg EC tablet Take 1 tablet (81 mg) by mouth once daily.      atorvastatin (Lipitor) 80 mg tablet Take 1 tablet (80 mg) by mouth once daily at bedtime. 90 tablet 3    benzonatate (Tessalon) 200 mg capsule Take 1 capsule (200 mg) by mouth 3 times a day as needed.      cycloSPORINE (Restasis) 0.05 % ophthalmic emulsion Administer 1 drop into both eyes 2 times a day.      escitalopram (Lexapro) 10 mg tablet Take 1 tablet (10 mg) by mouth once daily. 90 tablet 3    famotidine (Pepcid) 20 mg tablet Take 1 tablet (20 mg) by mouth 2 times a day. 180 tablet 3    furosemide (Lasix) 40 mg tablet Take 1 tablet (40 mg) by mouth 2 times a day.      gabapentin (Neurontin) 300 mg capsule Take 1 capsule (300 mg) by mouth 2 times a day. 180 capsule 3    ipratropium-albuteroL (Duo-Neb) 0.5-2.5 mg/3 mL nebulizer solution Take 3 mL by nebulization every 4 hours if needed.      isosorbide mononitrate ER (Imdur) 60 mg 24 hr tablet Take 1 tablet (60 mg) by mouth once daily in the morning. Take before meals.      metoprolol tartrate (Lopressor) 25 mg tablet Take 1 tablet (25 mg) by mouth every 12 hours.      montelukast (Singulair) 10 mg tablet Take 1 tablet (10 mg) by mouth once daily.      ProAir HFA 90 mcg/actuation inhaler Inhale 2 puffs every 4 hours if needed.       pseudoephedrine-guaifenesin (Mucinex D)  mg 12 hr tablet Take by mouth every 12 hours.      Spiriva with HandiHaler 18 mcg inhalation capsule INHALE CONTENTS OF 1 CAPSULE EVERY DAY      spironolactone (Aldactone) 25 mg tablet Take 1 tablet (25 mg) by mouth once daily. 90 tablet 3    [DISCONTINUED] albuterol 2.5 mg /3 mL (0.083 %) nebulizer solution Inhale 3 mL (2.5 mg) twice a day. Inhale over 5-15 minutes      [DISCONTINUED] benzonatate (Tessalon) 100 mg capsule Take 2 capsules (200 mg) by mouth 3 times a day. Do not crush or chew.      [DISCONTINUED] fluticasone propion-salmeteroL (Advair) 115-21 mcg/actuation inhaler Inhale 2 puffs twice a day.      [DISCONTINUED] sodium chloride 7 % solution for nebulization nebulizer solution Inhale 4 mL twice a day.       No current facility-administered medications on file prior to visit.        Allergies:   Allergies   Allergen Reactions    Adhesive Tape-Silicones Unknown    Amoxicillin Diarrhea    Amoxicillin-Pot Clavulanate Unknown    Azelastine Unknown    Beconase Aq [Beclomethasone Diprop (Aq)] Other     Nose bleed     Clopidogrel Bisulfate Unknown    Cromolyn Swelling and Other     NASALCROM - NOSE BLEEDS    Fluticasone Propionate Unknown    Guaifenesin Unknown    Levofloxacin In D5w Unknown    Nasalcrom A Swelling     (nose)    Oxcarbazepine Unknown    Oxycodone-Acetaminophen Swelling and Other     Anxiety, unable to sleep, agitation     Paxil [Paroxetine Hcl] Other     Agitation, Jittery     Phenylephrine-Guaifenesin Swelling and Other    Phenylpropanolamine Hcl Unknown    Pseudoephedrine Other     ENTEX LA - JITTERY; Agitation      Sulfabenzamide Unknown    Tolterodine Swelling     swelling of hands    Venlafaxine Swelling and Other     Behavior changes     Ace Inhibitors Rash     Dry cough    Corticosteroids (Glucocorticoids) GI bleeding, Rash, Swelling and Other     swelling - BECONASE    Heavy nose bleed    Epinephrine Rash, Swelling and Other      Tachycardia    Karaya Gum Rash    Levofloxacin Palpitations, Rash, Other and Dizziness     Irritability     Olopatadine Rash, Swelling and Other     Serious sinus infection    Oxybutynin Chloride Rash and Other    Paroxetine Swelling, Palpitations and Other    Phenylpropanolamine Nausea And Vomiting    Sulfamethoxazole-Trimethoprim Swelling, Rash, Other and Headache     Severe headache, neck pain, aseptic meningitis       SH:    Social Determinants of Health     Tobacco Use: Medium Risk (10/3/2023)    Patient History     Smoking Tobacco Use: Former     Smokeless Tobacco Use: Never     Passive Exposure: Not on file   Alcohol Use: Not on file   Financial Resource Strain: Not on file   Food Insecurity: Not on file   Transportation Needs: Not on file   Physical Activity: Not on file   Stress: Not on file   Social Connections: Not on file   Intimate Partner Violence: Not on file   Depression: Not at risk (10/4/2023)    PHQ-2     PHQ-2 Score: 0   Housing Stability: Not on file   Utilities: Not on file   Digital Equity: Not on file        FH:  Family History   Problem Relation Name Age of Onset    Dementia Mother      Glaucoma Mother      Hypertension Mother      Blood clot Mother          in the brain    Cancer Father      Prostate cancer Father      Glaucoma Sister      Hypertension Sister      Cancer Brother          Objective:  Vitals:  Vitals:    10/16/23 1252   BP: 114/51   Pulse: 65   SpO2: 98%        Exam:  Gen: in NAD  GI: Soft, ND/NT  Ext:  B DP and PT with doppler signals.   BLE without any tissue loss.     Imaging:  CTA (7/10/2023) reviewed and discussed with the patient and his partner. I recommended medical therapy given his extensive cardiac comorbidity if patient's sx is non-life limiting. I also discussed revascularization will not improve his RLE strength which has been weak since his remote neurologic event.     KAROL (5/17/2023):  R 0.6, L 0.88  Toe pressure: R 38, L 62    Carotid duplex (5/5/2023):  R  ICA with >70% stenosis.   L ICA 50-69% stenosis.       Assessment & Plan:  Gregory Tai is 88 y.o. male with CAD, AS, HLD, HTN, CHF and R>L LE claudication.    - CTA (7/10/2023) reviewed and discussed with the patient and his partner. I recommended medical therapy given his extensive cardiac comorbidity if patient's sx is non-life limiting. I also discussed revascularization will not improve his RLE strength which has been weak since his remote neurologic event.    - Carotid stenosis: Rec. To cont. ASA and statin therapy. At his advanced age with complex comorbidity, I recommended BMT over carotid intervention.    - Follow up in 6 months with surveillance vascular labs.     Neo Irene M.D.  Clinical   [facility] [employer]  Co-Director, Aortic Center  [facility] [place]

## 2023-10-30 ENCOUNTER — TELEPHONE (OUTPATIENT)
Dept: PRIMARY CARE | Facility: CLINIC | Age: 88
End: 2023-10-30
Payer: MEDICARE

## 2023-10-30 NOTE — TELEPHONE ENCOUNTER
Pt's wife, left a msg, stating that Gregory had an incident at 2am.  Gregory told her that he went to get up and his body was shaking, couldn't control his extremities, and his heart was beating so fast.  He told Luisa this morning that this happened and that he has gained 9 lbs since Monday the 24th.  Dr. Oconnell increased his lasix today to 160 mg.  He usually takes 80 mg 2x/day on Tues & Sat, and 40 mg 2x/day all other days.   She was concerned that maybe the brain tumor might be back, but isn't having HA's.    He has an appt with Izabella Aguayo on 11/9

## 2023-11-09 ENCOUNTER — OFFICE VISIT (OUTPATIENT)
Dept: PRIMARY CARE | Facility: CLINIC | Age: 88
End: 2023-11-09
Payer: MEDICARE

## 2023-11-09 VITALS
DIASTOLIC BLOOD PRESSURE: 67 MMHG | SYSTOLIC BLOOD PRESSURE: 116 MMHG | HEART RATE: 76 BPM | OXYGEN SATURATION: 91 % | BODY MASS INDEX: 29.26 KG/M2 | WEIGHT: 189.6 LBS | TEMPERATURE: 97.3 F

## 2023-11-09 DIAGNOSIS — R55 SYNCOPE, UNSPECIFIED SYNCOPE TYPE: Primary | ICD-10-CM

## 2023-11-09 DIAGNOSIS — I50.9 CHRONIC CONGESTIVE HEART FAILURE, UNSPECIFIED HEART FAILURE TYPE (MULTI): ICD-10-CM

## 2023-11-09 PROCEDURE — 1159F MED LIST DOCD IN RCRD: CPT | Performed by: NURSE PRACTITIONER

## 2023-11-09 PROCEDURE — 3078F DIAST BP <80 MM HG: CPT | Performed by: NURSE PRACTITIONER

## 2023-11-09 PROCEDURE — 99214 OFFICE O/P EST MOD 30 MIN: CPT | Performed by: NURSE PRACTITIONER

## 2023-11-09 PROCEDURE — 3074F SYST BP LT 130 MM HG: CPT | Performed by: NURSE PRACTITIONER

## 2023-11-09 PROCEDURE — 1160F RVW MEDS BY RX/DR IN RCRD: CPT | Performed by: NURSE PRACTITIONER

## 2023-11-09 PROCEDURE — 1036F TOBACCO NON-USER: CPT | Performed by: NURSE PRACTITIONER

## 2023-11-09 ASSESSMENT — ENCOUNTER SYMPTOMS
PSYCHIATRIC NEGATIVE: 1
RESPIRATORY NEGATIVE: 1

## 2023-11-09 NOTE — PROGRESS NOTES
Subjective   Patient ID: Gregory Tai is a 88 y.o. male who presents for Dizziness (Feeling like going to black out), Excessive Sweating, and Flu Vaccine (Already received 10/3).    HPI Presents today for follow up on episode on 10/29/2023  Became shaky, sweating and felt weak  No coordination for about 10 minutes.   Took his BP was 99/50.   Went to bed, woke up feelign fine and has been fine since.   Called his cardiologist because his legs had been swellign and his weight was up.   He increased her lasix to 8 mg bid for 3 days.  Now back to 40 mg bid and two days a week does 80 mg bid  BP has been 110-120/50-60 at home since.       Review of Systems   Constitutional:         As noted in HPI     Respiratory: Negative.     Cardiovascular:         As noted in HPI     Psychiatric/Behavioral: Negative.         Objective   /67   Pulse 76   Temp 36.3 °C (97.3 °F)   Wt 86 kg (189 lb 9.6 oz)   SpO2 91%   BMI 29.26 kg/m²     Physical Exam  Constitutional:       Appearance: Normal appearance.   Cardiovascular:      Rate and Rhythm: Normal rate and regular rhythm.   Pulmonary:      Effort: Pulmonary effort is normal.      Breath sounds: Normal breath sounds.   Musculoskeletal:         General: Normal range of motion.      Right lower leg: No edema.      Left lower leg: No edema.   Neurological:      General: No focal deficit present.      Mental Status: He is alert.   Psychiatric:         Mood and Affect: Mood normal.         Behavior: Behavior normal.         Assessment/Plan   Problem List Items Addressed This Visit             ICD-10-CM    Chronic CHF (CMS/[facility]) I50.9    Syncope - Primary R55           Dupixent Pregnancy And Lactation Text: This medication likely crosses the placenta but the risk for the fetus is uncertain. This medication is excreted in breast milk.

## 2023-11-15 ENCOUNTER — OFFICE VISIT (OUTPATIENT)
Dept: CARDIOLOGY | Facility: HOSPITAL | Age: 88
End: 2023-11-15
Payer: MEDICARE

## 2023-11-15 ENCOUNTER — HOSPITAL ENCOUNTER (OUTPATIENT)
Dept: CARDIOLOGY | Facility: HOSPITAL | Age: 88
Discharge: HOME | End: 2023-11-15
Payer: MEDICARE

## 2023-11-15 VITALS
OXYGEN SATURATION: 98 % | WEIGHT: 189 LBS | DIASTOLIC BLOOD PRESSURE: 66 MMHG | HEIGHT: 67 IN | SYSTOLIC BLOOD PRESSURE: 143 MMHG | BODY MASS INDEX: 29.66 KG/M2 | HEART RATE: 78 BPM

## 2023-11-15 DIAGNOSIS — I10 HYPERTENSION, UNSPECIFIED TYPE: ICD-10-CM

## 2023-11-15 DIAGNOSIS — I50.32 CHRONIC DIASTOLIC CONGESTIVE HEART FAILURE (MULTI): ICD-10-CM

## 2023-11-15 DIAGNOSIS — R00.2 PALPITATIONS: ICD-10-CM

## 2023-11-15 DIAGNOSIS — I65.23 BILATERAL CAROTID ARTERY STENOSIS: ICD-10-CM

## 2023-11-15 DIAGNOSIS — I25.119 ATHEROSCLEROSIS OF NATIVE CORONARY ARTERY OF NATIVE HEART WITH ANGINA PECTORIS (CMS-HCC): Primary | ICD-10-CM

## 2023-11-15 DIAGNOSIS — R41.0 DISORIENTATION: ICD-10-CM

## 2023-11-15 DIAGNOSIS — I65.23 BILATERAL CAROTID ARTERY STENOSIS: Primary | ICD-10-CM

## 2023-11-15 PROCEDURE — 1160F RVW MEDS BY RX/DR IN RCRD: CPT | Performed by: NURSE PRACTITIONER

## 2023-11-15 PROCEDURE — 99214 OFFICE O/P EST MOD 30 MIN: CPT | Performed by: NURSE PRACTITIONER

## 2023-11-15 PROCEDURE — 3074F SYST BP LT 130 MM HG: CPT | Performed by: NURSE PRACTITIONER

## 2023-11-15 PROCEDURE — 3078F DIAST BP <80 MM HG: CPT | Performed by: NURSE PRACTITIONER

## 2023-11-15 PROCEDURE — 93246 EXT ECG>7D<15D RECORDING: CPT

## 2023-11-15 PROCEDURE — 93248 EXT ECG>7D<15D REV&INTERPJ: CPT | Performed by: INTERNAL MEDICINE

## 2023-11-15 PROCEDURE — 1126F AMNT PAIN NOTED NONE PRSNT: CPT | Performed by: NURSE PRACTITIONER

## 2023-11-15 PROCEDURE — 1036F TOBACCO NON-USER: CPT | Performed by: NURSE PRACTITIONER

## 2023-11-15 PROCEDURE — 93005 ELECTROCARDIOGRAM TRACING: CPT | Performed by: NURSE PRACTITIONER

## 2023-11-15 PROCEDURE — 1159F MED LIST DOCD IN RCRD: CPT | Performed by: NURSE PRACTITIONER

## 2023-11-15 PROCEDURE — 93010 ELECTROCARDIOGRAM REPORT: CPT | Performed by: INTERNAL MEDICINE

## 2023-11-15 NOTE — PATIENT INSTRUCTIONS
Event monitor  Blood work CBC, BMP, TSH  Continue current cardiovascular medications  Follow up as scheduled  Referral to neurology  Follow up with Daniela Clifton and Dr. Irene regarding benefits of another carotid ultrasound

## 2023-11-15 NOTE — PROGRESS NOTES
Primary Care Physician: Suellen Waite MD  Date of Visit: 11/15/2023  2:30 PM EST  Location of visit: [facility]     Chief Complaint:   No chief complaint on file.       HPI / Summary:   Gregory Tai is a 88 y.o. male presents for followup. Seen in collaboration with Dr. Bass. He is accompanied by his wife whom provides majority of information. She reports on 10/29 at 0200 he was sitting watching TV. He stood up to go to bed felt discomfort in left leg and lost control of his arms and legs. He was disoriented and diaphoretic. He felt his heart racing fast and left chest wall pressure. He tried brushing his teeth, but could not control the tooth brush. He went to bed and the next morning woke up feeling his usual self. His wife reports blood pressure the next afternoon was 112/56. Upon waking up wife was concerned as he had gained 6 pounds and legs swollen. She spoke with Dr. Oconnell. He instructed her to have patient take Lasix 80 mg BID on Monday and Tuesday following the incident then resume normal schedule. He again had another episode on 11/12 at 6:30 PM at which time he finished using his nebulizer. He took off his mask and stood up. While walking his balance was off. Wife reports facial drooping and drooling on left side. He was disoriented. Felt heart racing rapidly. He was then sitting on a bench stated he was starving. He ate two kind bars. Subsequently did not say much. He got up and went to bed until 8 PM. Upon waking up he was his usual self. No further episodes. His chronic dyspnea on exertion is unchanged. His chronic lower extremity edema is at baseline  The patient denies lightheadedness, syncope, orthopnea, paroxysmal nocturnal dyspnea, or bleeding problems.              Past Medical History:  Past Medical History:   Diagnosis Date    Anesthesia of skin     Numbness    Chronic obstructive pulmonary disease with (acute) exacerbation (CMS/[facility]) 02/19/2013    Obstructive chronic bronchitis with  exacerbation    Disease of stomach and duodenum, unspecified     Stomach problems    Dizziness and giddiness     Lightheadedness    Essential (primary) hypertension 09/06/2022    Hypertension, unspecified type    Orchitis 02/19/2013    Orchitis    Other conditions influencing health status 02/19/2013    Meatal stenosis    Other pulmonary embolism without acute cor pulmonale (CMS/HCC) 06/23/2013    Pulmonary embolism    Other specified soft tissue disorders 06/23/2013    Limb swelling    Other spondylosis with myelopathy, cervical region 06/23/2013    Cervical spondylosis with myelopathy    Personal history of other diseases of male genital organs     History of prostate disorder    Personal history of other diseases of the circulatory system 08/26/2020    History of peripheral vascular disease    Personal history of other diseases of the musculoskeletal system and connective tissue     History of arthritis    Personal history of other diseases of the nervous system and sense organs     History of macular degeneration    Personal history of other specified conditions     History of urinary incontinence    Urinary tract infection, site not specified 02/19/2013    Urinary tract infection        Past Surgical History:  Past Surgical History:   Procedure Laterality Date    CATARACT EXTRACTION  11/27/2017    Cataract Surgery    CHOLECYSTECTOMY  11/27/2017    Cholecystectomy    COLONOSCOPY  02/06/2014    Colonoscopy (Fiberoptic)    CT ANGIO NECK W  3/12/2013    CT NECK ANGIO W AND WO IV CONTRAST 3/12/2013 [facility]IENT [facility]GAC[facility]    CT AORTA AND BILATERAL ILIOFEMORAL RUNOFF ANGIOGRAM W AND/OR WO IV CONTRAST  6/21/2022    CT AORTA AND BILATERAL ILIOFEMORAL RUNOFF ANGIOGRAM W AND/OR WO IV CONTRAST 6/21/2022 [facility]    CT AORTA AND BILATERAL ILIOFEMORAL RUNOFF ANGIOGRAM W AND/OR WO IV CONTRAST  7/10/2023    CT AORTA AND BILATERAL ILIOFEMORAL RUNOFF ANGIOGRAM W AND/OR WO IV CONTRAST 7/10/2023 [facility] CT    OTHER SURGICAL  HISTORY  07/22/2013    Arthrodesis Cervical    OTHER SURGICAL HISTORY  12/10/2013    Surgery    OTHER SURGICAL HISTORY  07/09/2019    Cardiac catheterization with stent placement    OTHER SURGICAL HISTORY  10/02/2020    Blepharoplasty    TONSILLECTOMY  02/03/2014    Tonsillectomy With Adenoidectomy          Social History:   reports that he has quit smoking. His smoking use included cigarettes. He has never used smokeless tobacco. He reports that he does not currently use alcohol. He reports that he does not use drugs.     Family History:  family history includes Blood clot in his mother; Cancer in his brother and father; Dementia in his mother; Glaucoma in his mother and sister; Hypertension in his mother and sister; Prostate cancer in his father.      Allergies:  Allergies   Allergen Reactions    Adhesive Tape-Silicones Unknown    Amoxicillin Diarrhea    Amoxicillin-Pot Clavulanate Unknown    Azelastine Unknown    Beconase Aq [Beclomethasone Diprop (Aq)] Other     Nose bleed     Clopidogrel Bisulfate Unknown    Cromolyn Swelling and Other     NASALCROM - NOSE BLEEDS    Cromolyn Sodium Other    Fluticasone Propionate Unknown    Guaifenesin Unknown    Levofloxacin In D5w Unknown    Nasalcrom A Swelling     (nose)    Oxcarbazepine Unknown    Oxycodone-Acetaminophen Swelling and Other     Anxiety, unable to sleep, agitation     Paxil [Paroxetine Hcl] Other     Agitation, Jittery     Phenylephrine-Guaifenesin Swelling and Other    Phenylpropanolamine Hcl Unknown    Pseudoephedrine Other     ENTEX LA - JITTERY; Agitation      Sulfabenzamide Unknown    Tolterodine Swelling     swelling of hands    Venlafaxine Swelling and Other     Behavior changes     Ace Inhibitors Rash     Dry cough    Corticosteroids (Glucocorticoids) GI bleeding, Rash, Swelling and Other     swelling - BECONASE    Heavy nose bleed    Epinephrine Rash, Swelling and Other     Tachycardia    Karaya Gum Rash    Levofloxacin Palpitations, Rash, Other and  Dizziness     Irritability     Olopatadine Rash, Swelling and Other     Serious sinus infection    Oxybutynin Chloride Rash and Other    Paroxetine Swelling, Palpitations and Other    Phenylpropanolamine Nausea And Vomiting    Sulfamethoxazole-Trimethoprim Swelling, Rash, Other and Headache     Severe headache, neck pain, aseptic meningitis       Outpatient Medications:  Current Outpatient Medications   Medication Instructions    acetaminophen (Tylenol) 325 mg tablet 1-2 tablets, oral, Every 6 hours    albuterol 2.5 mg /3 mL (0.083 %) nebulizer solution inhalation    aspirin 81 mg EC tablet 1 tablet, oral, Daily    atorvastatin (LIPITOR) 80 mg, oral, Nightly    benzonatate (TESSALON) 200 mg, oral, 3 times daily PRN    cycloSPORINE (Restasis) 0.05 % ophthalmic emulsion 1 drop, Both Eyes, 2 times daily    escitalopram (LEXAPRO) 10 mg, oral, Daily    famotidine (PEPCID) 20 mg, oral, 2 times daily    furosemide (LASIX) 40 mg, oral, 2 times daily    gabapentin (NEURONTIN) 300 mg, oral, 2 times daily    ipratropium-albuteroL (Duo-Neb) 0.5-2.5 mg/3 mL nebulizer solution 3 mL, nebulization, Every 4 hours PRN    isosorbide mononitrate ER (Imdur) 60 mg 24 hr tablet 1 tablet, oral, Daily before breakfast    metoprolol tartrate (Lopressor) 25 mg tablet 1 tablet, oral, Every 12 hours    montelukast (SINGULAIR) 10 mg, oral, Daily    ProAir HFA 90 mcg/actuation inhaler 2 puffs, inhalation, Every 4 hours PRN    pseudoephedrine-guaifenesin (Mucinex D)  mg 12 hr tablet oral, Every 12 hours    Spiriva with HandiHaler 18 mcg inhalation capsule INHALE CONTENTS OF 1 CAPSULE EVERY DAY    spironolactone (ALDACTONE) 25 mg, oral, Daily       Physical Exam:  Vitals:    11/15/23 1437 11/15/23 1443 11/15/23 1448   BP: 123/64 136/74 143/66   BP Location: Right arm Right arm Right arm   Patient Position: 5 min laying Sitting Standing   BP Cuff Size: Adult     Pulse: 77 75 78   SpO2: 98%     Weight: 85.7 kg (189 lb)     Height: 1.702 m (5'  "7\")       Wt Readings from Last 5 Encounters:   11/15/23 85.7 kg (189 lb)   11/09/23 86 kg (189 lb 9.6 oz)   10/16/23 86.2 kg (190 lb)   10/03/23 85.9 kg (189 lb 6.4 oz)   07/10/23 87.5 kg (193 lb)     Body mass index is 29.6 kg/m².     GENERAL: alert, cooperative, pleasant, in no acute distress  SKIN: warm and dry  NECK: Normal JVD, negative HJR  CARDIAC: Regular rate and rhythm with 2/6 early peaking systolic murmur at base, no rub or gallop  CHEST: Normal respiratory efforts, lungs clear to auscultation bilaterally.  ABDOMEN: soft, nontender, nondistended  EXTREMITIES: Trivial bilateral lower extremity edema, +2 palpable RP bilaterally       Last Labs:  Recent Labs     09/27/23  1200 07/27/23  1422 11/14/22  1459   WBC 5.8 6.1 6.6   HGB 12.7* 12.7* 12.6*   HCT 40.0* 40.3* 40.6*    174 184   MCV 96 96 97     Recent Labs     09/27/23  1200 07/27/23  1422 06/30/23  1304    140 142   K 4.8 4.1 4.0    103 105   BUN 29* 30* 33*   CREATININE 1.36* 1.36* 1.30     CMP -  Lab Results   Component Value Date    CALCIUM 8.9 09/27/2023    PHOS 3.1 07/27/2023    PROT 5.5 (L) 03/15/2022    ALBUMIN 3.7 07/27/2023    AST 22 03/15/2022    ALT 32 09/27/2023    ALKPHOS 38 03/15/2022    BILITOT 0.8 03/15/2022       LIPID PANEL -   Lab Results   Component Value Date    CHOL 106 09/27/2023    HDL 44.8 09/27/2023    LDLF 46 09/27/2023    TRIG 78 09/27/2023       Lab Results   Component Value Date     (H) 11/06/2019    HGBA1C 6.6 (A) 09/27/2023       Last Cardiology Tests:  ECG:  Obtained and reviewed EKG- normal sinus rhythm HR 77    Echo:  Echo Results:  CONCLUSIONS:  1. Left ventricular systolic function is normal with a 65-70% estimated ejection fraction.  2. Spectral Doppler shows an impaired relaxation pattern of left ventricular diastolic filling.  3. The left atrium is moderately dilated.  4. There is moderate mitral annular calcification.  5. Aortic valve appears abnormal.  6. Mild to moderate aortic " valve stenosis.  7. There is moderate aortic valve cusp calcification.            Cardiac Imaging:  Echocardiogram  [facility], [address] [place] [address]  Tel [phone] and Fax [phone]    TRANSTHORACIC ECHOCARDIOGRAM REPORT    Patient Name:     CASEY HAQUE Reading Physician:   [id number] Toñito Izquierdo MD  Study Date:       9/12/2023      Referring Physician: TOÑITO IZQUIERDO  MRN/PID:          [id number]       PCP:  Accession/Order#: [id number]   Department Location: [facility]  YOB: 1935       Fellow:  Gender:           M              Nurse:  Admit Date:       9/12/2023      Sonographer:         Jose Adkins [facility]  Admission Status: Outpatient     Additional Staff:  Height:           170.18 cm      CC Report to:  Weight:           84.82 kg       Study Type:          Echocardiogram  BSA:              1.97 m2  Blood Pressure: 118 /60 mmHg    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    Study Detail: The following Echo studies were performed: 2D, M-Mode, Doppler and  color flow. Technically challenging study due to body habitus.    PHYSICIAN INTERPRETATION:  Left Ventricle: The left ventricular systolic function is normal, with an estimated ejection fraction of 65-70%. The left ventricular cavity size is normal. There is mild concentric left ventricular hypertrophy. Spectral Doppler shows an impaired relaxation pattern of left ventricular diastolic filling.  Left Atrium: The left atrium is moderately dilated.  Right Ventricle: The right ventricle is normal in size. There is normal right ventricular global systolic function.  Right Atrium: The right atrium is normal in size.  Aortic Valve: The aortic valve appears abnormal. The number of aortic cusps could not be determined from this study. There is  moderate aortic valve cusp calcification. There is evidence of mild to moderate aortic valve stenosis.  The aortic valve dimensionless index is 0.57. There is trivial aortic valve regurgitation. The peak instantaneous gradient of the aortic valve is 17.3 mmHg. The mean gradient of the aortic valve is 8.3 mmHg.  Mitral Valve: The mitral valve is mildly thickened. There is moderate mitral annular calcification. There is trace to mild mitral valve regurgitation.  Tricuspid Valve: The tricuspid valve is structurally normal. There is trace tricuspid regurgitation.  Pulmonic Valve: The pulmonic valve is not well visualized. The pulmonic valve regurgitation was not well visualized.  Pericardium: There is no pericardial effusion noted.  Aorta: The aortic root is normal.  In comparison to the previous echocardiogram(s): Compared with study from 3/24/2021, There is less mitral regurgitation.    CONCLUSIONS:  1. Left ventricular systolic function is normal with a 65-70% estimated ejection fraction.  2. Spectral Doppler shows an impaired relaxation pattern of left ventricular diastolic filling.  3. The left atrium is moderately dilated.  4. There is moderate mitral annular calcification.  5. Aortic valve appears abnormal.  6. Mild to moderate aortic valve stenosis.  7. There is moderate aortic valve cusp calcification.    QUANTITATIVE DATA SUMMARY:  2D MEASUREMENTS:  Normal Ranges:  LAs:           4.58 cm    (2.7-4.0cm)  IVSd:          1.22 cm    (0.6-1.1cm)  LVPWd:         1.29 cm    (0.6-1.1cm)  LVIDd:         4.46 cm    (3.9-5.9cm)  LVIDs:         2.71 cm  LV Mass Index: 105.7 g/m2  LV % FS        39.2 %    LA VOLUME:  Normal Ranges:  LA Vol A4C:        75.5 ml    (22+/-6mL/m2)  LA Vol A2C:        80.3 ml  LA Vol BP:         82.6 ml  LA Vol Index A4C:  38.4ml/m2  LA Vol Index A2C:  40.9 ml/m2  LA Vol Index BP:   42.0 ml/m2  LA Area A4C:       22.3 cm2  LA Area A2C:       24.4 cm2  LA Major Axis A4C: 5.6 cm  LA Major Axis  A2C: 6.3 cm  LA Volume Index:   42.0 ml/m2  LA Vol A4C:        69.5 ml  LA Vol A2C:        76.6 ml    M-MODE MEASUREMENTS:  Normal Ranges:  LAs: 4.10 cm (2.7-4.0cm)    AORTA MEASUREMENTS:  Normal Ranges:  Ao Sinus, d: 3.20 cm (2.1-3.5cm)  Ao STJ, d:   2.70 cm (1.7-3.4cm)  Asc Ao, d:   3.40 cm (2.1-3.4cm)    LV SYSTOLIC FUNCTION BY 2D PLANIMETRY (MOD):  Normal Ranges:  EF-A4C View: 75.7 % (>=55%)  EF-A2C View: 52.4 %  EF-Biplane:  66.0 %    LV DIASTOLIC FUNCTION:  Normal Ranges:  MV Peak E:        0.90 m/s    (0.7-1.2 m/s)  MV Peak A:        0.94 m/s    (0.42-0.7 m/s)  E/A Ratio:        0.95        (1.0-2.2)  MV e'             0.06 m/s    (>8.0)  MV lateral e'     0.04 m/s  MV medial e'      0.06 m/s  MV A Dur:         115.34 msec  E/e' Ratio:       14.94       (<8.0)  PulmV Sys Nam:    44.62 cm/s  PulmV Blackburn Nam:   26.91 cm/s  PulmV S/D Nam:    1.66  PulmV A Revs Nam: 27.78 cm/s  PulmV A Revs Dur: 96.89 msec    MITRAL VALVE:  Normal Ranges:  MV Vmax:    0.93 m/s (<=1.3m/s)  MV peak PG: 3.4 mmHg (<5mmHg)  MV mean P.7 mmHg (<2mmHg)  MV VTI:     22.51 cm (10-13cm)  MV DT:      192 msec (150-240msec)    AORTIC VALVE:  Normal Ranges:  AoV Vmax:                2.08 m/s  (<=1.7m/s)  AoV Peak P.3 mmHg (<20mmHg)  AoV Mean P.3 mmHg  (1.7-11.5mmHg)  LVOT Max Nam:            1.11 m/s  (<=1.1m/s)  AoV VTI:                 40.69 cm  (18-25cm)  LVOT VTI:                23.38 cm  LVOT Diameter:           1.98 cm   (1.8-2.4cm)  AoV Area, VTI:           1.78 cm2  (2.5-5.5cm2)  AoV Area,Vmax:           1.65 cm2  (2.5-4.5cm2)  AoV Dimensionless Index: 0.57    RIGHT VENTRICLE:  RV Basal 3.50 cm  RV Mid   2.20 cm  RV Major 7.7 cm  TAPSE:   27.0 mm    PULMONIC VALVE:  Normal Ranges:  PV Accel Time: 129 msec (>120ms)  PV Max Nam:    1.3 m/s  (0.6-0.9m/s)  PV Max P.8 mmHg    Pulmonary Veins:  PulmV A Revs Dur: 96.89 msec  PulmV A Revs Anm: 27.78 cm/s  PulmV Blackburn Nam:   26.91 cm/s  PulmV S/D Nam:     1.66  PulmV Sys Nam:    44.62 cm/s    [id number] Neo Bass MD  Electronically signed on 9/15/2023 at 8:46:47 AM          Assessment/Plan   Diagnoses and all orders for this visit:  Atherosclerosis of native coronary artery of native heart with angina pectoris (CMS/HCC)  -     ECG 12 lead (Clinic Performed)  Palpitations  -     CBC; Future  -     Basic metabolic panel; Future  -     TSH with reflex to Free T4 if abnormal; Future  -     Holter Or Event Cardiac Monitor; Future  Disorientation  -     Referral to Neurology; Future  Hypertension, unspecified type  Bilateral carotid artery stenosis  Chronic diastolic congestive heart failure (CMS/HCC)  -     CBC; Future  -     Basic metabolic panel; Future  In summary, Mr. Tai is a pleasant 88 year-old white male with a past medical history significant for coronary artery disease status post PCI Ã--2 in 2004 in the setting of a myocardial infarction with preserved LV function, hypertension, hyperlipidemia, carotid atherosclerosis with a questionable remote TIA, COPD, chronic aspiration, prior perioperative DVT/PE, prior tobacco use, aortic stenosis, chronic diastolic heart failure, type II non-insulin-requiring diabetes, and peripheral arterial disease. He was seen today as he had two recent episodes of disorientation, loss of control of arms and legs during one episode, and left side facial drooling and drooping with most recent episode. I have referred him to neurology for further evaluation for TIA. Given these episodes and palpitations I did order an event monitor for surveillance of arrhythmia. I reached out to Daniela Clifton CNP regarding carotid stenosis. She will order carotid ultrasound and see him in the office. I have ordered blood work as above. He appears euvolemic by clinical exam. He will continue current cardiovascular medications. He will follow up as scheduled. He will call sooner with concerns.                Orders:  No orders of the defined types were  placed in this encounter.     Followup Appts:  Future Appointments   Date Time Provider Department Center   2/6/2024  9:30 AM Suellen Waite MD [id number] [facility]   4/2/2024 10:30 AM GODWIN Antunez [id number] [facility]   4/18/2024  1:30 PM Duy Kim OD [id number] [facility]   5/8/2024  8:00 AM TWINS VASC [id number] [facility]    5/8/2024  9:00 AM TWINS VASC [id number] [facility]    5/16/2024 10:00 AM GODWIN Riojas [id number] [facility]   9/17/2024 10:00 AM AHU ECHO 1 [id number] [facility]   9/17/2024 11:20 AM Neo Bass MD [id number] [facility]   10/3/2024  9:30 AM Suellen Waite MD [id number] [facility]           ____________________________________________________________  GODWIN Antunez  [facility] [place]  [facility]

## 2023-11-20 ENCOUNTER — LAB (OUTPATIENT)
Dept: LAB | Facility: LAB | Age: 88
End: 2023-11-20
Payer: MEDICARE

## 2023-11-20 DIAGNOSIS — R00.2 PALPITATIONS: ICD-10-CM

## 2023-11-20 DIAGNOSIS — I50.32 CHRONIC DIASTOLIC CONGESTIVE HEART FAILURE (MULTI): ICD-10-CM

## 2023-11-20 LAB
ANION GAP SERPL CALC-SCNC: 8 MMOL/L (ref 10–20)
BUN SERPL-MCNC: 29 MG/DL (ref 6–23)
CALCIUM SERPL-MCNC: 8.8 MG/DL (ref 8.6–10.3)
CHLORIDE SERPL-SCNC: 102 MMOL/L (ref 98–107)
CO2 SERPL-SCNC: 35 MMOL/L (ref 21–32)
CREAT SERPL-MCNC: 1.37 MG/DL (ref 0.5–1.3)
ERYTHROCYTE [DISTWIDTH] IN BLOOD BY AUTOMATED COUNT: 12.8 % (ref 11.5–14.5)
GFR SERPL CREATININE-BSD FRML MDRD: 50 ML/MIN/1.73M*2
GLUCOSE SERPL-MCNC: 123 MG/DL (ref 74–99)
HCT VFR BLD AUTO: 38.7 % (ref 41–52)
HGB BLD-MCNC: 12.3 G/DL (ref 13.5–17.5)
MCH RBC QN AUTO: 30.6 PG (ref 26–34)
MCHC RBC AUTO-ENTMCNC: 31.8 G/DL (ref 32–36)
MCV RBC AUTO: 96 FL (ref 80–100)
NRBC BLD-RTO: 0 /100 WBCS (ref 0–0)
PLATELET # BLD AUTO: 175 X10*3/UL (ref 150–450)
POTASSIUM SERPL-SCNC: 4.3 MMOL/L (ref 3.5–5.3)
RBC # BLD AUTO: 4.02 X10*6/UL (ref 4.5–5.9)
SODIUM SERPL-SCNC: 141 MMOL/L (ref 136–145)
TSH SERPL-ACNC: 1.89 MIU/L (ref 0.44–3.98)
WBC # BLD AUTO: 6 X10*3/UL (ref 4.4–11.3)

## 2023-11-20 PROCEDURE — 85027 COMPLETE CBC AUTOMATED: CPT

## 2023-11-20 PROCEDURE — 36415 COLL VENOUS BLD VENIPUNCTURE: CPT

## 2023-11-20 PROCEDURE — 80048 BASIC METABOLIC PNL TOTAL CA: CPT

## 2023-11-20 PROCEDURE — 84443 ASSAY THYROID STIM HORMONE: CPT

## 2023-12-03 LAB
ATRIAL RATE: 77 BPM
P AXIS: -71 DEGREES
P OFFSET: 197 MS
P ONSET: 144 MS
PR INTERVAL: 164 MS
Q ONSET: 226 MS
QRS COUNT: 13 BEATS
QRS DURATION: 96 MS
QT INTERVAL: 424 MS
QTC CALCULATION(BAZETT): 479 MS
QTC FREDERICIA: 460 MS
R AXIS: 72 DEGREES
T AXIS: 13 DEGREES
T OFFSET: 438 MS
VENTRICULAR RATE: 77 BPM

## 2023-12-14 ENCOUNTER — HOSPITAL ENCOUNTER (OUTPATIENT)
Dept: RADIOLOGY | Facility: HOSPITAL | Age: 88
Discharge: HOME | End: 2023-12-14
Payer: MEDICARE

## 2023-12-14 DIAGNOSIS — M79.671 PAIN IN RIGHT FOOT: ICD-10-CM

## 2023-12-14 DIAGNOSIS — M79.661 PAIN IN RIGHT LOWER LEG: ICD-10-CM

## 2023-12-14 DIAGNOSIS — M25.571 ARTHRALGIA OF RIGHT ANKLE: ICD-10-CM

## 2023-12-14 DIAGNOSIS — M25.571 PAIN IN RIGHT ANKLE AND JOINTS OF RIGHT FOOT: ICD-10-CM

## 2023-12-14 PROCEDURE — 73610 X-RAY EXAM OF ANKLE: CPT | Mod: RIGHT SIDE | Performed by: RADIOLOGY

## 2023-12-14 PROCEDURE — 73630 X-RAY EXAM OF FOOT: CPT | Mod: RT,FY

## 2023-12-14 PROCEDURE — 93971 EXTREMITY STUDY: CPT

## 2023-12-14 PROCEDURE — 73610 X-RAY EXAM OF ANKLE: CPT | Mod: RT,FY

## 2023-12-14 PROCEDURE — 93971 EXTREMITY STUDY: CPT | Performed by: RADIOLOGY

## 2023-12-14 PROCEDURE — 73630 X-RAY EXAM OF FOOT: CPT | Mod: RIGHT SIDE | Performed by: RADIOLOGY

## 2023-12-20 ENCOUNTER — APPOINTMENT (OUTPATIENT)
Dept: VASCULAR MEDICINE | Facility: HOSPITAL | Age: 88
End: 2023-12-20
Payer: MEDICARE

## 2023-12-21 ENCOUNTER — OFFICE VISIT (OUTPATIENT)
Dept: VASCULAR SURGERY | Facility: CLINIC | Age: 88
End: 2023-12-21
Payer: MEDICARE

## 2023-12-21 ENCOUNTER — HOSPITAL ENCOUNTER (OUTPATIENT)
Dept: VASCULAR MEDICINE | Facility: CLINIC | Age: 88
Discharge: HOME | End: 2023-12-21
Payer: MEDICARE

## 2023-12-21 VITALS
HEART RATE: 77 BPM | BODY MASS INDEX: 29.96 KG/M2 | WEIGHT: 191.3 LBS | SYSTOLIC BLOOD PRESSURE: 98 MMHG | DIASTOLIC BLOOD PRESSURE: 52 MMHG | OXYGEN SATURATION: 98 %

## 2023-12-21 DIAGNOSIS — I73.9 PAD (PERIPHERAL ARTERY DISEASE) (CMS-HCC): ICD-10-CM

## 2023-12-21 DIAGNOSIS — R09.89 OTHER SPECIFIED SYMPTOMS AND SIGNS INVOLVING THE CIRCULATORY AND RESPIRATORY SYSTEMS: ICD-10-CM

## 2023-12-21 DIAGNOSIS — I65.21 CAROTID ARTERY STENOSIS WITHOUT CEREBRAL INFARCTION, RIGHT: Primary | ICD-10-CM

## 2023-12-21 DIAGNOSIS — I65.23 BILATERAL CAROTID ARTERY STENOSIS: ICD-10-CM

## 2023-12-21 PROCEDURE — 1036F TOBACCO NON-USER: CPT | Performed by: CLINICAL NURSE SPECIALIST

## 2023-12-21 PROCEDURE — 3074F SYST BP LT 130 MM HG: CPT | Performed by: CLINICAL NURSE SPECIALIST

## 2023-12-21 PROCEDURE — 99214 OFFICE O/P EST MOD 30 MIN: CPT | Performed by: CLINICAL NURSE SPECIALIST

## 2023-12-21 PROCEDURE — 1159F MED LIST DOCD IN RCRD: CPT | Performed by: CLINICAL NURSE SPECIALIST

## 2023-12-21 PROCEDURE — 93880 EXTRACRANIAL BILAT STUDY: CPT

## 2023-12-21 PROCEDURE — 1160F RVW MEDS BY RX/DR IN RCRD: CPT | Performed by: CLINICAL NURSE SPECIALIST

## 2023-12-21 PROCEDURE — 1126F AMNT PAIN NOTED NONE PRSNT: CPT | Performed by: CLINICAL NURSE SPECIALIST

## 2023-12-21 PROCEDURE — 93880 EXTRACRANIAL BILAT STUDY: CPT | Performed by: SURGERY

## 2023-12-21 PROCEDURE — 3078F DIAST BP <80 MM HG: CPT | Performed by: CLINICAL NURSE SPECIALIST

## 2023-12-21 ASSESSMENT — ENCOUNTER SYMPTOMS
OCCASIONAL FEELINGS OF UNSTEADINESS: 1
DEPRESSION: 1
LOSS OF SENSATION IN FEET: 0

## 2023-12-21 ASSESSMENT — PAIN SCALES - GENERAL: PAINLEVEL: 0-NO PAIN

## 2023-12-21 NOTE — PATIENT INSTRUCTIONS
It was a pleasure to see you today.  Your carotid studies actually remain stable with less than 50% stenosis in left ICA and right ICA remains stable with 70% stenosis  It is hard to say what is happening with your episodes of passing out.  Wife mentions history of brain tumor in 2015  You are scheduled to see neurology and having difficulty making appointment with Dr. Hawk.  I will send a message to Dr. Hawk's office to see if we can get you in sooner.  Lets see you back in 6 months with a repeat carotid duplex and appointment with Dr. Irene for follow-up.  Follow-up sooner if needed.  If you have any strokelike symptoms you should go to the emergency room.  Please call [phone]

## 2023-12-21 NOTE — PROGRESS NOTES
Vascular Surgery Consultation, History, Physical    Gregory Tai is a 88 y.o. year old male patient who we are following for bilateral carotid artery stenosis and PAD.  Patient here with his wife today.  Patient wife like tells me that the patient has had several falls and episodes passing out since October.  Patient's wife also tells me that his face does get distorted looking during these episodes of passing out.  The last time he was happened he woke up really thirsty and wanted food right away.  It is hard to say if blood sugars were low patient denies any chest pain, shortness of breath, fever or chills.  Patient really does not recall any symptoms prior to passing out.  Patient denies any loss of vision right eye or motor deficits.    History:   85-year-old male with a very challenging hospitalization in 2013. He had severe MRSA infection after cervical neck surgery. He developed a right calf vein DVT. He had a filter placed by Dr. Syed that was then subsequently removed. He FU for specific issue related to his leg     he has chronic limb swelling right greater than left. This is due to venous insufficiency. He is able to get on compression hose--10-15 mmHg occasionally. In addition, he has known SFA stenosis.  Previous ABIs acceptable. He denies claudication symptoms. He is a bit debilitated and gets leg fatigue on Both sides. I explained that this is multifactorial--likely musculoskeletal and related to his cardiopulmonary reserve. Thus we will elect to follow this.      carotid stenosis. needs surveillance. will consider intervention if >80%. However, he may not be interested in any surgery     Your carotid studies remains stable.  right ICA 70% stenosis and Left ICA 50-69% stenosis  asymptomatic  increased s/s of right leg claudication   spinal injections not helping  Past Medical History:   Diagnosis Date    Anesthesia of skin     Numbness    Chronic obstructive pulmonary disease with (acute)  exacerbation (CMS/[facility]) 02/19/2013    Obstructive chronic bronchitis with exacerbation    Disease of stomach and duodenum, unspecified     Stomach problems    Dizziness and giddiness     Lightheadedness    Essential (primary) hypertension 09/06/2022    Hypertension, unspecified type    Orchitis 02/19/2013    Orchitis    Other conditions influencing health status 02/19/2013    Meatal stenosis    Other pulmonary embolism without acute cor pulmonale (CMS/[facility]) 06/23/2013    Pulmonary embolism    Other specified soft tissue disorders 06/23/2013    Limb swelling    Other spondylosis with myelopathy, cervical region 06/23/2013    Cervical spondylosis with myelopathy    Personal history of other diseases of male genital organs     History of prostate disorder    Personal history of other diseases of the circulatory system 08/26/2020    History of peripheral vascular disease    Personal history of other diseases of the musculoskeletal system and connective tissue     History of arthritis    Personal history of other diseases of the nervous system and sense organs     History of macular degeneration    Personal history of other specified conditions     History of urinary incontinence    Urinary tract infection, site not specified 02/19/2013    Urinary tract infection       Past Surgical History:   Procedure Laterality Date    CATARACT EXTRACTION  11/27/2017    Cataract Surgery    CHOLECYSTECTOMY  11/27/2017    Cholecystectomy    COLONOSCOPY  02/06/2014    Colonoscopy (Fiberoptic)    CT ANGIO NECK  3/12/2013    CT NECK ANGIO W AND WO IV CONTRAST 3/12/2013 [facility] [place] [facility] INPATIENT LEGACY    CT AORTA AND BILATERAL ILIOFEMORAL RUNOFF ANGIOGRAM W AND/OR WO IV CONTRAST  6/21/2022    CT AORTA AND BILATERAL ILIOFEMORAL RUNOFF ANGIOGRAM W AND/OR WO IV CONTRAST 6/21/2022 [facility]    CT AORTA AND BILATERAL ILIOFEMORAL RUNOFF ANGIOGRAM W AND/OR WO IV CONTRAST  7/10/2023    CT AORTA AND BILATERAL ILIOFEMORAL RUNOFF ANGIOGRAM W AND/OR WO  IV CONTRAST 7/10/2023 [facility] CT    OTHER SURGICAL HISTORY  07/22/2013    Arthrodesis Cervical    OTHER SURGICAL HISTORY  12/10/2013    Surgery    OTHER SURGICAL HISTORY  07/09/2019    Cardiac catheterization with stent placement    OTHER SURGICAL HISTORY  10/02/2020    Blepharoplasty    TONSILLECTOMY  02/03/2014    Tonsillectomy With Adenoidectomy       Active Ambulatory Problems     Diagnosis Date Noted    Anemia 05/09/2023    Hypertension 05/09/2023    PAD (peripheral artery disease) (CMS/HCC) 05/09/2023    Benign prostatic hyperplasia 05/09/2023    Bilateral carotid artery stenosis 05/09/2023    Bilateral occipital neuralgia 05/09/2023    Obstructive sleep apnea syndrome 01/11/2007    Cervical myelopathy (CMS/HCC) 05/09/2023    Chronic CHF (CMS/HCC) 05/09/2023    Chronic obstructive pulmonary disease, group C, by Global Initiative for Chronic Obstructive Lung Disease 2013 classification (CMS/HCC) 05/09/2023    CKD (chronic kidney disease) stage 3, GFR 30-59 ml/min (CMS/HCC) 05/09/2023    Controlled diabetes mellitus with circulatory complication (CMS/HCC) 05/09/2023    Atherosclerosis of native coronary artery of native heart with angina pectoris (CMS/HCC)     Mixed simple and mucopurulent chronic bronchitis (CMS/HCC) 05/03/2018    Nonrheumatic aortic valve stenosis 05/09/2023    Reactive airway disease 05/09/2023    Recurrent major depressive disorder (CMS/[facility]) 05/09/2023    Keratitis sicca, bilateral (CMS/[facility]) 05/09/2023    Actinic keratosis 08/27/2014    Angioma 08/27/2014    Heart palpitations 09/13/2023    Gallbladder stone with nonacute cholecystitis 09/13/2023    Esophageal web 09/13/2023    Esophageal reflux 09/18/2006    Esophageal dysphagia 07/05/2018    Epiretinal membrane (ERM) of right eye 09/13/2023    Elevated serum creatinine 09/13/2023    Edema of extremities 09/13/2023    Ear canal dryness, bilateral 09/13/2023    Dizziness and giddiness 09/18/2006    Disorder of prostate 09/18/2006    Diffuse  esophageal spasm 09/13/2023    Depression 09/13/2023    Chronic pain 03/08/2016    Concussion with brief LOC 12/09/2017    Cervical postlaminectomy syndrome 09/13/2023    Chronic cough 09/13/2023    Bilateral sensorineural hearing loss 09/13/2023    Balanitis 09/13/2023    Auditory discrimination impairment, bilateral 09/13/2023    Aspiration into airway 04/03/2018    Anxiety 12/09/2017    AMD (age-related macular degeneration), bilateral 09/13/2023    Trigger ring finger of left hand 09/13/2023    Trigger middle finger of left hand 09/13/2023    Senile purpura (CMS/[facility]) 08/27/2014    Arthropathy 09/18/2006    Sun-damaged skin 08/27/2014    Condition not found 06/01/2004    Extrapyramidal disease and abnormal movement disorder 05/16/2007    History of coronary artery stent placement 09/13/2023    Hyperlipidemia 09/18/2006    Hyponatremia 09/13/2023    Impaired fasting blood sugar 09/13/2023    Impairment of balance 12/09/2017    Left carpal tunnel syndrome 09/13/2023    Left inguinal hernia 09/13/2023    Lumbar stenosis 09/13/2023    Lung nodule 05/03/2018    Mixed incontinence 04/04/2018    MRSA infection 04/03/2018    Myalgia 04/26/2016    Neuropathy 12/09/2017    Obesity, Class I, BMI 30-34.9 11/06/2018    Pseudophakia of both eyes 09/13/2023    Restless legs syndrome 09/13/2023    Smell disorder 09/13/2023    Spondylosis without myelopathy or radiculopathy, sacral and sacrococcygeal region 03/08/2016    Syncope 09/13/2023    Tinea cruris 09/13/2023    Urgency of urination 04/04/2018    Hemangioma, capillary 09/13/2023    Atheroscler of native artery of right leg with intermit claudication (CMS/[facility]) 09/13/2023    Atherosclerotic peripheral vascular disease (CMS/[facility]) 06/15/2017    Bilateral dry eyes 09/13/2023    Central sleep apnea 09/13/2023    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     Resolved Ambulatory Problems     Diagnosis Date Noted    No Resolved Ambulatory Problems     Past Medical History:    Diagnosis Date    Anesthesia of skin     Chronic obstructive pulmonary disease with (acute) exacerbation (CMS/[facility]) 02/19/2013    Disease of stomach and duodenum, unspecified     Essential (primary) hypertension 09/06/2022    Orchitis 02/19/2013    Other conditions influencing health status 02/19/2013    Other pulmonary embolism without acute cor pulmonale (CMS/[facility]) 06/23/2013    Other specified soft tissue disorders 06/23/2013    Other spondylosis with myelopathy, cervical region 06/23/2013    Personal history of other diseases of male genital organs     Personal history of other diseases of the circulatory system 08/26/2020    Personal history of other diseases of the musculoskeletal system and connective tissue     Personal history of other diseases of the nervous system and sense organs     Personal history of other specified conditions     Urinary tract infection, site not specified 02/19/2013       Review of Systems   All other systems reviewed and are negative.      Allergies   Allergen Reactions    Adhesive Tape-Silicones Unknown    Amoxicillin Diarrhea    Amoxicillin-Pot Clavulanate Unknown    Azelastine Unknown    Beconase Aq [Beclomethasone Diprop (Aq)] Other     Nose bleed     Clopidogrel Bisulfate Unknown    Cromolyn Swelling and Other     NASALCROM - NOSE BLEEDS    Cromolyn Sodium Other    Fluticasone Propionate Unknown    Guaifenesin Unknown    Levofloxacin In D5w Unknown    Nasalcrom A Swelling     (nose)    Oxcarbazepine Unknown    Oxycodone-Acetaminophen Swelling and Other     Anxiety, unable to sleep, agitation     Paxil [Paroxetine Hcl] Other     Agitation, Jittery     Phenylephrine-Guaifenesin Swelling and Other    Phenylpropanolamine Hcl Unknown    Pseudoephedrine Other     ENTEX LA - JITTERY; Agitation      Sulfabenzamide Unknown    Tolterodine Swelling     swelling of hands    Venlafaxine Swelling and Other     Behavior changes     Ace Inhibitors Rash     Dry cough    Corticosteroids  (Glucocorticoids) GI bleeding, Rash, Swelling and Other     swelling - BECONASE    Heavy nose bleed    Epinephrine Rash, Swelling and Other     Tachycardia    Karaya Gum Rash    Levofloxacin Palpitations, Rash, Other and Dizziness     Irritability     Olopatadine Rash, Swelling and Other     Serious sinus infection    Oxybutynin Chloride Rash and Other    Paroxetine Swelling, Palpitations and Other    Phenylpropanolamine Nausea And Vomiting    Sulfamethoxazole-Trimethoprim Swelling, Rash, Other and Headache     Severe headache, neck pain, aseptic meningitis       Vitals:   Visit Vitals  BP 98/52 (BP Location: Right arm, Patient Position: Sitting, BP Cuff Size: Adult)   Pulse 77     Physical Exam:   Vascular Physical Exam  Vitals and nursing note reviewed.   Constitutional:       General: He is awake.   Cardiovascular:      Rate and Rhythm: Normal rate and regular rhythm.      Pulses:           Carotid pulses are 2+ on the right side and 2+ on the left side.       Radial pulses are 2+ on the right side and 2+ on the left side.        Femoral pulses are 2+ on the right side and 2+ on the left side.       Popliteal pulses are 2+ on the right side and 2+ on the left side.        Dorsalis pedis pulses are detected w/ Doppler on the right side and detected w/ Doppler on the left side.  Right dorsalis pedis pulse signal is biphasic. Left dorsalis pedis pulse signal is biphasic.         Posterior tibial pulses are detected w/ Doppler on the right side and detected w/ Doppler on the left side. Right posterior tibial pulse signal is biphasic. Left posterior tibial pulse signal is biphasic.   Pulmonary:      Effort: Pulmonary effort is normal.      Breath sounds: Normal breath sounds and air entry.   Musculoskeletal:      Neck: Neck supple.   Skin:     General: Skin is warm and dry.   Neurological:      Mental Status: He is alert.         Labs:  LABS:  CBC with Differential:    Lab Results   Component Value Date    WBC 6.0  "11/20/2023    RBC 4.02 (L) 11/20/2023    HGB 12.3 (L) 11/20/2023    HCT 38.7 (L) 11/20/2023     11/20/2023    MCV 96 11/20/2023    MCH 30.6 11/20/2023    MCHC 31.8 (L) 11/20/2023    RDW 12.8 11/20/2023    NRBC 0.0 11/20/2023    LYMPHOPCT 18.4 11/14/2022    MONOPCT 10.8 11/14/2022    EOSPCT 1.7 11/14/2022    BASOPCT 0.5 11/14/2022    MONOSABS 0.71 11/14/2022    LYMPHSABS 1.21 11/14/2022    EOSABS 0.11 11/14/2022    BASOSABS 0.03 11/14/2022     CMP:    Lab Results   Component Value Date     11/20/2023    K 4.3 11/20/2023     11/20/2023    CO2 35 (H) 11/20/2023    BUN 29 (H) 11/20/2023    CREATININE 1.37 (H) 11/20/2023    GLUCOSE 123 (H) 11/20/2023    PROT 5.5 (L) 03/15/2022    CALCIUM 8.8 11/20/2023    BILITOT 0.8 03/15/2022    ALKPHOS 38 03/15/2022    AST 22 03/15/2022    ALT 32 09/27/2023     BMP:    Lab Results   Component Value Date     11/20/2023    K 4.3 11/20/2023     11/20/2023    CO2 35 (H) 11/20/2023    BUN 29 (H) 11/20/2023    CREATININE 1.37 (H) 11/20/2023    CALCIUM 8.8 11/20/2023    GLUCOSE 123 (H) 11/20/2023     Magnesium:  Lab Results   Component Value Date    MG 2.10 11/09/2019     Troponin:  No results found for: \"TROPHS\"  BNP:   Lab Results   Component Value Date     (H) 11/06/2019       Lab Results   Component Value Date    INR 1.0 11/06/2019    PROTIME 11.5 11/06/2019    CHOL 106 09/27/2023    LDLF 46 09/27/2023    HDL 44.8 09/27/2023       Imaging: carotid duplex    Impression: 70% stenosis right ICA and <50% stenosis left ICA    Plan:   It was a pleasure to see you today.  Your carotid studies actually remain stable with less than 50% stenosis in left ICA and right ICA remains stable with 70% stenosis  It is hard to say what is happening with your episodes of passing out.  Wife mentions history of brain tumor in 2015  You are scheduled to see neurology and having difficulty making appointment with Dr. Hawk.  I will send a message to Dr. Hawk's office to " see if we can get you in sooner.  Lets see you back in 6 months with a repeat carotid duplex and appointment with Dr. Irene for follow-up.  Follow-up sooner if needed.  If you have any strokelike symptoms you should go to the emergency room.  Please call [phone]

## 2023-12-22 LAB — BODY SURFACE AREA: 2.01 M2

## 2024-01-12 ENCOUNTER — TELEPHONE (OUTPATIENT)
Dept: CARDIOLOGY | Facility: HOSPITAL | Age: 89
End: 2024-01-12
Payer: MEDICARE

## 2024-01-12 NOTE — TELEPHONE ENCOUNTER
"----- Message from Rosie Dumont, APRN-CNP sent at 1/11/2024  4:20 PM EST -----  Event monitor showed brief SVT longest episode 11 beats. No changes.     Wife reports:    Patient experienced headache, perfuse sweating, with fall 11.16.24 the morning after event monitor was placed (less than 24 hours). He refused to go to the ER. He pulled the phone out of her hand so she could not call EMS.    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 He is now established with a neurologist.     He can gain upwards of 5 to 6 lbs in one day. She is confident in the accuracy of the scale. Dr. Oconnell is aware and managing.     Instructed wife to call EMS in the event of LOC, fall, or change of mental status and to report any manageable symptoms like weight gain, swelling, and sob during the window before upcoming appointments in the next few months.     Will report any additional instructions.                           "

## 2024-01-15 ENCOUNTER — LAB (OUTPATIENT)
Dept: LAB | Facility: LAB | Age: 89
End: 2024-01-15
Payer: MEDICARE

## 2024-01-15 ENCOUNTER — ANCILLARY PROCEDURE (OUTPATIENT)
Dept: RADIOLOGY | Facility: CLINIC | Age: 89
End: 2024-01-15
Payer: MEDICARE

## 2024-01-15 DIAGNOSIS — N40.1 BENIGN PROSTATIC HYPERPLASIA WITH LOWER URINARY TRACT SYMPTOMS: ICD-10-CM

## 2024-01-15 DIAGNOSIS — M79.671 PAIN IN RIGHT FOOT: ICD-10-CM

## 2024-01-15 DIAGNOSIS — I25.10 ATHEROSCLEROTIC HEART DISEASE OF NATIVE CORONARY ARTERY WITHOUT ANGINA PECTORIS: Primary | ICD-10-CM

## 2024-01-15 DIAGNOSIS — E78.49 OTHER HYPERLIPIDEMIA: ICD-10-CM

## 2024-01-15 DIAGNOSIS — N18.30 CHRONIC KIDNEY DISEASE, STAGE 3 UNSPECIFIED (MULTI): ICD-10-CM

## 2024-01-15 LAB
ALBUMIN SERPL BCP-MCNC: 3.6 G/DL (ref 3.4–5)
ANION GAP SERPL CALC-SCNC: 11 MMOL/L (ref 10–20)
BUN SERPL-MCNC: 24 MG/DL (ref 6–23)
CALCIUM SERPL-MCNC: 8.8 MG/DL (ref 8.6–10.3)
CHLORIDE SERPL-SCNC: 102 MMOL/L (ref 98–107)
CO2 SERPL-SCNC: 32 MMOL/L (ref 21–32)
CREAT SERPL-MCNC: 1.26 MG/DL (ref 0.5–1.3)
EGFRCR SERPLBLD CKD-EPI 2021: 55 ML/MIN/1.73M*2
GLUCOSE SERPL-MCNC: 151 MG/DL (ref 74–99)
PHOSPHATE SERPL-MCNC: 3.3 MG/DL (ref 2.5–4.9)
POTASSIUM SERPL-SCNC: 3.9 MMOL/L (ref 3.5–5.3)
SODIUM SERPL-SCNC: 141 MMOL/L (ref 136–145)

## 2024-01-15 PROCEDURE — 80069 RENAL FUNCTION PANEL: CPT

## 2024-01-15 PROCEDURE — 36415 COLL VENOUS BLD VENIPUNCTURE: CPT

## 2024-01-15 PROCEDURE — 73630 X-RAY EXAM OF FOOT: CPT | Mod: RIGHT SIDE | Performed by: RADIOLOGY

## 2024-01-15 PROCEDURE — 73630 X-RAY EXAM OF FOOT: CPT | Mod: RT

## 2024-01-16 ENCOUNTER — LAB (OUTPATIENT)
Dept: LAB | Facility: LAB | Age: 89
End: 2024-01-16
Payer: MEDICARE

## 2024-01-16 DIAGNOSIS — N18.30 CHRONIC KIDNEY DISEASE, STAGE 3 UNSPECIFIED (MULTI): ICD-10-CM

## 2024-01-16 DIAGNOSIS — E78.49 OTHER HYPERLIPIDEMIA: ICD-10-CM

## 2024-01-16 DIAGNOSIS — I25.10 ATHEROSCLEROTIC HEART DISEASE OF NATIVE CORONARY ARTERY WITHOUT ANGINA PECTORIS: ICD-10-CM

## 2024-01-16 DIAGNOSIS — N40.1 BENIGN PROSTATIC HYPERPLASIA WITH LOWER URINARY TRACT SYMPTOMS: ICD-10-CM

## 2024-01-16 LAB
APPEARANCE UR: ABNORMAL
BACTERIA #/AREA URNS AUTO: ABNORMAL /HPF
BILIRUB UR STRIP.AUTO-MCNC: NEGATIVE MG/DL
COLOR UR: YELLOW
CREAT UR-MCNC: 29.3 MG/DL (ref 20–370)
GLUCOSE UR STRIP.AUTO-MCNC: NEGATIVE MG/DL
HYALINE CASTS #/AREA URNS AUTO: ABNORMAL /LPF
KETONES UR STRIP.AUTO-MCNC: NEGATIVE MG/DL
LEUKOCYTE ESTERASE UR QL STRIP.AUTO: ABNORMAL
MICROALBUMIN UR-MCNC: <7 MG/L
MICROALBUMIN/CREAT UR: NORMAL MG/G{CREAT}
MUCOUS THREADS #/AREA URNS AUTO: ABNORMAL /LPF
NITRITE UR QL STRIP.AUTO: NEGATIVE
PH UR STRIP.AUTO: 7 [PH]
PROT UR STRIP.AUTO-MCNC: NEGATIVE MG/DL
RBC # UR STRIP.AUTO: NEGATIVE /UL
RBC #/AREA URNS AUTO: ABNORMAL /HPF
SP GR UR STRIP.AUTO: 1.01
UROBILINOGEN UR STRIP.AUTO-MCNC: <2 MG/DL
WBC #/AREA URNS AUTO: ABNORMAL /HPF

## 2024-01-16 PROCEDURE — 82570 ASSAY OF URINE CREATININE: CPT

## 2024-01-16 PROCEDURE — 81001 URINALYSIS AUTO W/SCOPE: CPT

## 2024-01-16 PROCEDURE — 82043 UR ALBUMIN QUANTITATIVE: CPT

## 2024-02-06 ENCOUNTER — OFFICE VISIT (OUTPATIENT)
Dept: PRIMARY CARE | Facility: CLINIC | Age: 89
End: 2024-02-06
Payer: MEDICARE

## 2024-02-06 VITALS
SYSTOLIC BLOOD PRESSURE: 108 MMHG | TEMPERATURE: 97.4 F | DIASTOLIC BLOOD PRESSURE: 62 MMHG | BODY MASS INDEX: 28.79 KG/M2 | WEIGHT: 183.8 LBS

## 2024-02-06 DIAGNOSIS — G95.9 CERVICAL MYELOPATHY (MULTI): ICD-10-CM

## 2024-02-06 DIAGNOSIS — M35.01 KERATITIS SICCA, BILATERAL (MULTI): ICD-10-CM

## 2024-02-06 DIAGNOSIS — I26.99 OTHER PULMONARY EMBOLISM WITHOUT ACUTE COR PULMONALE, UNSPECIFIED CHRONICITY (MULTI): ICD-10-CM

## 2024-02-06 DIAGNOSIS — K21.9 GASTROESOPHAGEAL REFLUX DISEASE, UNSPECIFIED WHETHER ESOPHAGITIS PRESENT: ICD-10-CM

## 2024-02-06 DIAGNOSIS — J44.9: ICD-10-CM

## 2024-02-06 DIAGNOSIS — D64.9 ANEMIA, UNSPECIFIED TYPE: ICD-10-CM

## 2024-02-06 DIAGNOSIS — I25.119 ATHEROSCLEROSIS OF NATIVE CORONARY ARTERY OF NATIVE HEART WITH ANGINA PECTORIS (CMS-HCC): ICD-10-CM

## 2024-02-06 DIAGNOSIS — I50.32 CHRONIC DIASTOLIC CONGESTIVE HEART FAILURE (MULTI): ICD-10-CM

## 2024-02-06 DIAGNOSIS — N18.31 STAGE 3A CHRONIC KIDNEY DISEASE (MULTI): ICD-10-CM

## 2024-02-06 DIAGNOSIS — D69.2 SENILE PURPURA (CMS-HCC): ICD-10-CM

## 2024-02-06 DIAGNOSIS — J45.50 SEVERE PERSISTENT ASTHMA, UNCOMPLICATED (MULTI): ICD-10-CM

## 2024-02-06 DIAGNOSIS — E11.9 TYPE 2 DIABETES MELLITUS WITHOUT COMPLICATION, WITHOUT LONG-TERM CURRENT USE OF INSULIN (MULTI): ICD-10-CM

## 2024-02-06 DIAGNOSIS — E11.59 CONTROLLED TYPE 2 DIABETES MELLITUS WITH OTHER CIRCULATORY COMPLICATION, WITHOUT LONG-TERM CURRENT USE OF INSULIN (MULTI): Primary | ICD-10-CM

## 2024-02-06 DIAGNOSIS — F33.42 RECURRENT MAJOR DEPRESSIVE DISORDER, IN FULL REMISSION (CMS-HCC): ICD-10-CM

## 2024-02-06 PROCEDURE — 1126F AMNT PAIN NOTED NONE PRSNT: CPT | Performed by: INTERNAL MEDICINE

## 2024-02-06 PROCEDURE — 99214 OFFICE O/P EST MOD 30 MIN: CPT | Performed by: INTERNAL MEDICINE

## 2024-02-06 PROCEDURE — 3078F DIAST BP <80 MM HG: CPT | Performed by: INTERNAL MEDICINE

## 2024-02-06 PROCEDURE — 1159F MED LIST DOCD IN RCRD: CPT | Performed by: INTERNAL MEDICINE

## 2024-02-06 PROCEDURE — 1036F TOBACCO NON-USER: CPT | Performed by: INTERNAL MEDICINE

## 2024-02-06 PROCEDURE — 1157F ADVNC CARE PLAN IN RCRD: CPT | Performed by: INTERNAL MEDICINE

## 2024-02-06 PROCEDURE — 3074F SYST BP LT 130 MM HG: CPT | Performed by: INTERNAL MEDICINE

## 2024-02-06 PROCEDURE — 1160F RVW MEDS BY RX/DR IN RCRD: CPT | Performed by: INTERNAL MEDICINE

## 2024-02-06 RX ORDER — FAMOTIDINE 20 MG/1
20 TABLET, FILM COATED ORAL 2 TIMES DAILY
Qty: 180 TABLET | Refills: 3 | Status: SHIPPED | OUTPATIENT
Start: 2024-02-06 | End: 2025-02-05

## 2024-02-06 NOTE — PATIENT INSTRUCTIONS
Come back to see me in 4 months.  Eat regularly      Ways to Help Prevent Falls at Home    Quick Tips   ? Ask for help if you need it. Most people want to help!   ? Get up slowly after sitting or laying down   ? Wear a medical alert device or keep cell phone in your pocket   ? Use night lights, especially areas near a bathroom   ? Keep the items you use often within reach on a small stool or end table   ? Use an assistive device such as walker or cane, as directed by provider/physical therapy   ? Use a non-slip mat and grab bars in your bathroom. Look for home health sections for best options     Other Areas to Focus On   ? Exercise and nutrition: Regular exercise or taking a falls prevention class are great ways improve strength and balance. Don’t forget to stay hydrated and bring a snack!   ? Medicine side effects: Some medicines can make you sleepy or dizzy, which could cause a fall. Ask your healthcare provider about the side effects your medicines could cause. Be sure to let them know if you take any vitamins or supplements as well.   ? Tripping hazards: Remove items you could trip on, such as loose mats, rugs, cords, and clutter. Wear closed toe shoes with rubber soles.   ? Health and wellness: Get regular checkups with your healthcare provider, plus routine vision and hearing screenings. Talk with your healthcare provider about:   o Your medicines and the possible side effects - bring them in a bag if that is easier!   o Problems with balance or feeling dizzy   o Ways to promote bone health, such as Vitamin D and calcium supplements   o Questions or concerns about falling     *Ask your healthcare team if you have questions     [facility], 2022

## 2024-02-06 NOTE — PROGRESS NOTES
"Subjective   Reason for Visit: Gregory Tai is an 89 y.o. male here for f/u      Past Medical, Surgical, and Family History reviewed and updated in chart.    Reviewed all medications by prescribing practitioner or clinical pharmacist (such as prescriptions, OTCs, herbal therapies and supplements) and documented in the medical record.    HPI  OVN 11/9/23 reviewed.  No further episodes  Ongoing issue w/ pain, rt leg.  Likely claudication +/- neurogenic.  Working w/ vasc and pain med  #1 lt arm pain- remains an issue, increased x 1-2 weeks.  Constant.   #2 DM- diet \"ok\", little exercise  #3 BPH- ok UO.  Minimal nocturia  #4 CAD- no CP   #5 COPD/chronic bronchitis/LARON- no sig change  #6 depression- good    Patient Care Team:  Suellen Waite MD as PCP - General  GODWIN Riojas as PCP - MSSP ACO Attributed Provider  GODWIN Antunez as Nurse Practitioner (Cardiology)  Neo Bass MD as Consulting Physician (Cardiology)     Review of Systems   All other systems reviewed and are negative.      Objective   Vitals:  /62 (BP Location: Left arm, Patient Position: Sitting, BP Cuff Size: Adult)   Temp 36.3 °C (97.4 °F)   Wt 83.4 kg (183 lb 12.8 oz)   BMI 28.79 kg/m²       Physical Exam  Constitutional:       Appearance: Normal appearance.   Neurological:      Mental Status: He is alert.     Lab Results   Component Value Date    WBC 6.0 11/20/2023    HGB 12.3 (L) 11/20/2023    HCT 38.7 (L) 11/20/2023     11/20/2023    CHOL 106 09/27/2023    TRIG 78 09/27/2023    HDL 44.8 09/27/2023    ALT 32 09/27/2023    AST 22 03/15/2022     01/15/2024    K 3.9 01/15/2024     01/15/2024    CREATININE 1.26 01/15/2024    BUN 24 (H) 01/15/2024    CO2 32 01/15/2024    TSH 1.89 11/20/2023    INR 1.0 11/06/2019    HGBA1C 6.6 (A) 09/27/2023      Assessment/Plan   Problem List Items Addressed This Visit       Cervical myelopathy (CMS/HCC)    Chronic CHF (CMS/HCC)    Chronic obstructive pulmonary " disease, group C, by Global Initiative for Chronic Obstructive Lung Disease 2013 classification (CMS/HCC)    CKD (chronic kidney disease) stage 3, GFR 30-59 ml/min (CMS/[facility])    Controlled diabetes mellitus with circulatory complication (CMS/[facility]) - Primary    Atherosclerosis of native coronary artery of native heart with angina pectoris (CMS/[facility])    Recurrent major depressive disorder (CMS/[facility])    Keratitis sicca, bilateral (CMS/[facility])    Esophageal reflux    Senile purpura (CMS/[facility])    Severe persistent asthma, uncomplicated    Other pulmonary embolism without acute cor pulmonale, unspecified chronicity (CMS/[facility])     Other Visit Diagnoses       Type 2 diabetes mellitus without complication, without long-term current use of insulin (CMS/[facility])                #1 lt arm pain/ lt biceps tendon rupture- resolved. f/u ortho prn  #2 DM- stable. Spent significant time reviewing low sugar, reduced carbohydrate diet with exercise. Retest, will hold off rx as a1c <7.5 (goal)  #3 BPH- stable. Followup urology  #4 CAD-stable class I. Follow-up cardiology   #5 COPD/chronic bronchitis/LARON- remains an issue. f/u pulm ([facility]) /infectious disease ([facility]).   #6 depression- stable. b  #7 hyperlipidemia- good  #8 carotid disease - f/u vasc surg [facility]  #9 pulmonary nodule- stable x2 yrs. f/u CT w/ pulm ([facility])  #10 abd wall pain- resolved.  #11 htn- good. 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 f/u surgery.  #13 anemia/mild thrombocytopenia- stable/mild. s/p heme eval--> iron def. Off iron for now.   heme states no follow-up needed except follow-up lab works. retest   #14 cough- stable, but remains a major issue, perhaps a little better. Apparently chronic bronchitis. Follow pulmonology  #15 headache- stable. f/u neuro  #16 back pain/neuropathic pain- s/p epidurals. f/u neuro. strongly rec PT.  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  #19 GB dz- doing well  #20 esophageal spasm/web-  "Follow-up GI  #21 CTS- reviewed. f/u hand ortho.   #22 CKD- f/u Dr Lucio (neph).   #23 ? sinus issue/smell - resolved  #24 hyponatremia- apparently due to Trileptal plus likely combination of pulmonary disease/CHF. follow-up w/ dr lucio.  #25 pneumonia- resolved. f/u pulm  #26 CHF w/ preserved EF- stable. daily wt's. f/u cards.  #27 CTS   #28 rt LE claudication- f/u  vasc surgery  #29 \"episodes\" (last 11/16/23) - ? Etiology.  No episodes x 2+mths. eval w/ neuro pending.  Labs unrevealing.  Neg holter.  Reviewed precautions.   #30 [facility]- appt pending w/ neuro    rec COVID vaccine ASAP, stressed importance       Patient was identified as a fall risk. Risk prevention instructions provided.  Patient was identified as a fall risk. Risk prevention instructions provided.  "

## 2024-02-15 ENCOUNTER — LAB (OUTPATIENT)
Dept: LAB | Facility: LAB | Age: 89
End: 2024-02-15
Payer: MEDICARE

## 2024-02-15 ENCOUNTER — HOSPITAL ENCOUNTER (OUTPATIENT)
Dept: RADIOLOGY | Facility: CLINIC | Age: 89
Discharge: HOME | End: 2024-02-15
Payer: MEDICARE

## 2024-02-15 DIAGNOSIS — E11.9 TYPE 2 DIABETES MELLITUS WITHOUT COMPLICATION, WITHOUT LONG-TERM CURRENT USE OF INSULIN (MULTI): ICD-10-CM

## 2024-02-15 DIAGNOSIS — E11.59 CONTROLLED TYPE 2 DIABETES MELLITUS WITH OTHER CIRCULATORY COMPLICATION, WITHOUT LONG-TERM CURRENT USE OF INSULIN (MULTI): ICD-10-CM

## 2024-02-15 DIAGNOSIS — D64.9 ANEMIA, UNSPECIFIED TYPE: ICD-10-CM

## 2024-02-15 DIAGNOSIS — S92.424D NONDISPLACED FRACTURE OF DISTAL PHALANX OF RIGHT GREAT TOE, SUBSEQUENT ENCOUNTER FOR FRACTURE WITH ROUTINE HEALING: ICD-10-CM

## 2024-02-15 DIAGNOSIS — M79.671 PAIN IN RIGHT FOOT: ICD-10-CM

## 2024-02-15 LAB
ERYTHROCYTE [DISTWIDTH] IN BLOOD BY AUTOMATED COUNT: 12.6 % (ref 11.5–14.5)
EST. AVERAGE GLUCOSE BLD GHB EST-MCNC: 148 MG/DL
HBA1C MFR BLD: 6.8 %
HCT VFR BLD AUTO: 40.5 % (ref 41–52)
HGB BLD-MCNC: 12.7 G/DL (ref 13.5–17.5)
MCH RBC QN AUTO: 30 PG (ref 26–34)
MCHC RBC AUTO-ENTMCNC: 31.4 G/DL (ref 32–36)
MCV RBC AUTO: 96 FL (ref 80–100)
NRBC BLD-RTO: 0 /100 WBCS (ref 0–0)
PLATELET # BLD AUTO: 161 X10*3/UL (ref 150–450)
RBC # BLD AUTO: 4.24 X10*6/UL (ref 4.5–5.9)
WBC # BLD AUTO: 6 X10*3/UL (ref 4.4–11.3)

## 2024-02-15 PROCEDURE — 73630 X-RAY EXAM OF FOOT: CPT | Mod: RIGHT SIDE | Performed by: RADIOLOGY

## 2024-02-15 PROCEDURE — 85027 COMPLETE CBC AUTOMATED: CPT

## 2024-02-15 PROCEDURE — 36415 COLL VENOUS BLD VENIPUNCTURE: CPT

## 2024-02-15 PROCEDURE — 73630 X-RAY EXAM OF FOOT: CPT | Mod: RT

## 2024-02-15 PROCEDURE — 83036 HEMOGLOBIN GLYCOSYLATED A1C: CPT

## 2024-03-07 ENCOUNTER — HOSPITAL ENCOUNTER (OUTPATIENT)
Dept: RADIOLOGY | Facility: CLINIC | Age: 89
Discharge: HOME | End: 2024-03-07
Payer: MEDICARE

## 2024-03-07 DIAGNOSIS — M79.671 PAIN IN RIGHT FOOT: ICD-10-CM

## 2024-03-07 DIAGNOSIS — S92.424D NONDISPLACED FRACTURE OF DISTAL PHALANX OF RIGHT GREAT TOE, SUBSEQUENT ENCOUNTER FOR FRACTURE WITH ROUTINE HEALING: ICD-10-CM

## 2024-03-07 PROCEDURE — 73630 X-RAY EXAM OF FOOT: CPT | Mod: RIGHT SIDE | Performed by: RADIOLOGY

## 2024-03-07 PROCEDURE — 73630 X-RAY EXAM OF FOOT: CPT | Mod: RT

## 2024-03-15 DIAGNOSIS — I25.119 ATHEROSCLEROSIS OF NATIVE CORONARY ARTERY OF NATIVE HEART WITH ANGINA PECTORIS (CMS-HCC): ICD-10-CM

## 2024-03-15 DIAGNOSIS — I10 HYPERTENSION, UNSPECIFIED TYPE: Primary | ICD-10-CM

## 2024-03-15 RX ORDER — ISOSORBIDE MONONITRATE 60 MG/1
60 TABLET, EXTENDED RELEASE ORAL
Qty: 90 TABLET | Refills: 3 | Status: SHIPPED | OUTPATIENT
Start: 2024-03-15

## 2024-03-15 RX ORDER — METOPROLOL TARTRATE 25 MG/1
25 TABLET, FILM COATED ORAL EVERY 12 HOURS
Qty: 180 TABLET | Refills: 3 | Status: SHIPPED | OUTPATIENT
Start: 2024-03-15

## 2024-03-19 DIAGNOSIS — I50.9 CHRONIC CONGESTIVE HEART FAILURE, UNSPECIFIED HEART FAILURE TYPE (MULTI): ICD-10-CM

## 2024-03-19 DIAGNOSIS — I73.9 PAD (PERIPHERAL ARTERY DISEASE) (CMS-HCC): ICD-10-CM

## 2024-03-19 RX ORDER — SPIRONOLACTONE 25 MG/1
25 TABLET ORAL DAILY
Qty: 90 TABLET | Refills: 3 | Status: SHIPPED | OUTPATIENT
Start: 2024-03-19 | End: 2025-03-19

## 2024-03-19 RX ORDER — ATORVASTATIN CALCIUM 80 MG/1
80 TABLET, FILM COATED ORAL NIGHTLY
Qty: 90 TABLET | Refills: 3 | Status: SHIPPED | OUTPATIENT
Start: 2024-03-19 | End: 2025-03-19

## 2024-04-02 ENCOUNTER — LAB (OUTPATIENT)
Dept: LAB | Facility: LAB | Age: 89
End: 2024-04-02
Payer: MEDICARE

## 2024-04-02 ENCOUNTER — OFFICE VISIT (OUTPATIENT)
Dept: CARDIOLOGY | Facility: HOSPITAL | Age: 89
End: 2024-04-02
Payer: MEDICARE

## 2024-04-02 VITALS
HEIGHT: 67 IN | DIASTOLIC BLOOD PRESSURE: 52 MMHG | HEART RATE: 81 BPM | WEIGHT: 190 LBS | BODY MASS INDEX: 29.82 KG/M2 | OXYGEN SATURATION: 94 % | SYSTOLIC BLOOD PRESSURE: 96 MMHG

## 2024-04-02 DIAGNOSIS — I10 HYPERTENSION, UNSPECIFIED TYPE: ICD-10-CM

## 2024-04-02 DIAGNOSIS — E78.5 HYPERLIPIDEMIA, UNSPECIFIED HYPERLIPIDEMIA TYPE: ICD-10-CM

## 2024-04-02 DIAGNOSIS — I50.32 CHRONIC DIASTOLIC CONGESTIVE HEART FAILURE (MULTI): Primary | ICD-10-CM

## 2024-04-02 DIAGNOSIS — I50.32 CHRONIC DIASTOLIC CONGESTIVE HEART FAILURE (MULTI): ICD-10-CM

## 2024-04-02 DIAGNOSIS — I25.10 ATHEROSCLEROSIS OF NATIVE CORONARY ARTERY OF NATIVE HEART WITHOUT ANGINA PECTORIS: ICD-10-CM

## 2024-04-02 LAB
ANION GAP SERPL CALC-SCNC: 12 MMOL/L (ref 10–20)
BUN SERPL-MCNC: 29 MG/DL (ref 6–23)
CALCIUM SERPL-MCNC: 9.1 MG/DL (ref 8.6–10.3)
CHLORIDE SERPL-SCNC: 101 MMOL/L (ref 98–107)
CO2 SERPL-SCNC: 32 MMOL/L (ref 21–32)
CREAT SERPL-MCNC: 1.29 MG/DL (ref 0.5–1.3)
EGFRCR SERPLBLD CKD-EPI 2021: 54 ML/MIN/1.73M*2
GLUCOSE SERPL-MCNC: 116 MG/DL (ref 74–99)
POTASSIUM SERPL-SCNC: 4.3 MMOL/L (ref 3.5–5.3)
SODIUM SERPL-SCNC: 141 MMOL/L (ref 136–145)

## 2024-04-02 PROCEDURE — 1036F TOBACCO NON-USER: CPT | Performed by: NURSE PRACTITIONER

## 2024-04-02 PROCEDURE — 99214 OFFICE O/P EST MOD 30 MIN: CPT | Performed by: NURSE PRACTITIONER

## 2024-04-02 PROCEDURE — 3078F DIAST BP <80 MM HG: CPT | Performed by: NURSE PRACTITIONER

## 2024-04-02 PROCEDURE — 36415 COLL VENOUS BLD VENIPUNCTURE: CPT

## 2024-04-02 PROCEDURE — 80048 BASIC METABOLIC PNL TOTAL CA: CPT

## 2024-04-02 PROCEDURE — 3074F SYST BP LT 130 MM HG: CPT | Performed by: NURSE PRACTITIONER

## 2024-04-02 PROCEDURE — 1160F RVW MEDS BY RX/DR IN RCRD: CPT | Performed by: NURSE PRACTITIONER

## 2024-04-02 PROCEDURE — 1157F ADVNC CARE PLAN IN RCRD: CPT | Performed by: NURSE PRACTITIONER

## 2024-04-02 PROCEDURE — 1159F MED LIST DOCD IN RCRD: CPT | Performed by: NURSE PRACTITIONER

## 2024-04-02 RX ORDER — IPRATROPIUM BROMIDE AND ALBUTEROL SULFATE 2.5; .5 MG/3ML; MG/3ML
3 SOLUTION RESPIRATORY (INHALATION) DAILY
COMMUNITY

## 2024-04-02 NOTE — PROGRESS NOTES
Primary Care Physician: Suellen Waite MD  Date of Visit: 04/02/2024 10:30 AM EDT  Location of visit: [facility]     Chief Complaint:   Chief Complaint   Patient presents with    Follow-up        HPI / Summary:   Gregory Tai is a 89 y.o. male presents for followup. Seen in collaboration with Dr. Bass. He has not had any further episodes of disorientation or facial drooping. He had a mechanical fall on 11/16/23. He denies any loss of consciousness. He states he tripped on carpet. He has occasional lightheadedness upon standing up. Yesterday he felt lightheaded after standing up from bending over that persisted for 30 minutes. His chronic dyspnea on exertion is unchanged. His chronic lower extremity edema is at baseline.   The patient denies chest pain, syncope, palpitations, orthopnea, paroxysmal nocturnal dyspnea, or bleeding problems.              Past Medical History:  Past Medical History:   Diagnosis Date    Anesthesia of skin     Numbness    Chronic obstructive pulmonary disease with (acute) exacerbation (CMS/[facility]) 02/19/2013    Obstructive chronic bronchitis with exacerbation    Disease of stomach and duodenum, unspecified     Stomach problems    Dizziness and giddiness     Lightheadedness    Essential (primary) hypertension 09/06/2022    Hypertension, unspecified type    Orchitis 02/19/2013    Orchitis    Other conditions influencing health status 02/19/2013    Meatal stenosis    Other pulmonary embolism without acute cor pulmonale (CMS/[facility]) 06/23/2013    Pulmonary embolism    Other specified soft tissue disorders 06/23/2013    Limb swelling    Other spondylosis with myelopathy, cervical region 06/23/2013    Cervical spondylosis with myelopathy    Personal history of other diseases of male genital organs     History of prostate disorder    Personal history of other diseases of the circulatory system 08/26/2020    History of peripheral vascular disease    Personal history of other diseases of the  musculoskeletal system and connective tissue     History of arthritis    Personal history of other diseases of the nervous system and sense organs     History of macular degeneration    Personal history of other specified conditions     History of urinary incontinence    Urinary tract infection, site not specified 02/19/2013    Urinary tract infection        Past Surgical History:  Past Surgical History:   Procedure Laterality Date    CATARACT EXTRACTION  11/27/2017    Cataract Surgery    CHOLECYSTECTOMY  11/27/2017    Cholecystectomy    COLONOSCOPY  02/06/2014    Colonoscopy (Fiberoptic)    CT ANGIO NECK  3/12/2013    CT NECK ANGIO W AND WO IV CONTRAST 3/12/2013 [facility] INPATIENT LEGAC[facility]    CT AORTA AND BILATERAL ILIOFEMORAL RUNOFF ANGIOGRAM W AND/OR WO IV CONTRAST  6/21/2022    CT AORTA AND BILATERAL ILIOFEMORAL RUNOFF ANGIOGRAM W AND/OR WO IV CONTRAST 6/21/2022 [facility]    CT AORTA AND BILATERAL ILIOFEMORAL RUNOFF ANGIOGRAM W AND/OR WO IV CONTRAST  7/10/2023    CT AORTA AND BILATERAL ILIOFEMORAL RUNOFF ANGIOGRAM W AND/OR WO IV CONTRAST 7/10/2023 [facility] CT    OTHER SURGICAL HISTORY  07/22/2013    Arthrodesis Cervical    OTHER SURGICAL HISTORY  12/10/2013    Surgery    OTHER SURGICAL HISTORY  07/09/2019    Cardiac catheterization with stent placement    OTHER SURGICAL HISTORY  10/02/2020    Blepharoplasty    TONSILLECTOMY  02/03/2014    Tonsillectomy With Adenoidectomy          Social History:   reports that he has quit smoking. His smoking use included cigarettes. He has never used smokeless tobacco. He reports that he does not currently use alcohol. He reports that he does not use drugs.     Family History:  family history includes Blood clot in his mother; Cancer in his brother and father; Dementia in his mother; Glaucoma in his mother and sister; Hypertension in his mother and sister; Prostate cancer in his father.      Allergies:  Allergies   Allergen Reactions    Adhesive Tape-Silicones Unknown     Amoxicillin Diarrhea    Amoxicillin-Pot Clavulanate Unknown    Azelastine Unknown    Beconase Aq [Beclomethasone Diprop (Aq)] Other     Nose bleed     Clopidogrel Bisulfate Unknown    Cromolyn Swelling and Other     NASALCROM - NOSE BLEEDS    Cromolyn Sodium Other    Fluticasone Propionate Unknown    Guaifenesin Unknown    Levofloxacin In D5w Unknown    Nasalcrom A Swelling     (nose)    Oxcarbazepine Unknown    Oxycodone-Acetaminophen Swelling and Other     Anxiety, unable to sleep, agitation     Paxil [Paroxetine Hcl] Other     Agitation, Jittery     Phenylephrine-Guaifenesin Swelling and Other    Phenylpropanolamine Hcl Unknown    Pseudoephedrine Other     ENTEX LA - JITTERY; Agitation      Sulfabenzamide Unknown    Tolterodine Swelling     swelling of hands    Venlafaxine Swelling and Other     Behavior changes     Ace Inhibitors Rash     Dry cough    Corticosteroids (Glucocorticoids) GI bleeding, Rash, Swelling and Other     swelling - BECONASE    Heavy nose bleed    Epinephrine Rash, Swelling and Other     Tachycardia    Karaya Gum Rash    Levofloxacin Palpitations, Rash, Other and Dizziness     Irritability     Olopatadine Rash, Swelling and Other     Serious sinus infection    Oxybutynin Chloride Rash and Other    Paroxetine Swelling, Palpitations and Other    Phenylpropanolamine Nausea And Vomiting    Sulfamethoxazole-Trimethoprim Swelling, Rash, Other and Headache     Severe headache, neck pain, aseptic meningitis       Outpatient Medications:  Current Outpatient Medications   Medication Instructions    acetaminophen (Tylenol) 325 mg tablet 1-2 tablets, oral, Every 6 hours    aspirin 81 mg EC tablet 1 tablet, oral, Daily    atorvastatin (LIPITOR) 80 mg, oral, Nightly    benzonatate (TESSALON) 200 mg, oral, 3 times daily PRN    cycloSPORINE (Restasis) 0.05 % ophthalmic emulsion 1 drop, Both Eyes, 2 times daily    escitalopram (LEXAPRO) 10 mg, oral, Daily    famotidine (PEPCID) 20 mg, oral, 2 times daily     "furosemide (LASIX) 40 mg, oral, 2 times daily, On Tuesday and Saturday take 2 tablets (80 mg) twice daily per Dr. Oconnell    gabapentin (NEURONTIN) 300 mg, oral, 2 times daily    ipratropium-albuteroL (Duo-Neb) 0.5-2.5 mg/3 mL nebulizer solution 3 mL, nebulization, Daily    isosorbide mononitrate ER (IMDUR) 60 mg, oral, Daily before breakfast    metoprolol tartrate (LOPRESSOR) 25 mg, oral, Every 12 hours    montelukast (SINGULAIR) 10 mg, oral, Daily    pseudoephedrine-guaifenesin (Mucinex D)  mg 12 hr tablet oral, Every 12 hours    Spiriva with HandiHaler 18 mcg inhalation capsule INHALE CONTENTS OF 1 CAPSULE EVERY DAY    spironolactone (ALDACTONE) 25 mg, oral, Daily       Physical Exam:  Vitals:    04/02/24 1027   BP: (!) 96/36   BP Location: Right arm   Pulse: 81   SpO2: 94%   Weight: 86.2 kg (190 lb)   Height: 1.702 m (5' 7\")     Wt Readings from Last 5 Encounters:   04/02/24 86.2 kg (190 lb)   02/06/24 83.4 kg (183 lb 12.8 oz)   12/21/23 86.8 kg (191 lb 4.8 oz)   11/15/23 85.7 kg (189 lb)   11/09/23 86 kg (189 lb 9.6 oz)     Body mass index is 29.76 kg/m².     GENERAL: alert, cooperative, pleasant, in no acute distress  SKIN: warm and dry  NECK: Normal JVD, negative HJR  CARDIAC: Regular rate and rhythm with 2/6 early peaking systolic murmur at base, no rub or gallop  CHEST: Normal respiratory efforts, lungs clear to auscultation bilaterally.  ABDOMEN: soft, nontender, nondistended  EXTREMITIES: Trivial bilateral lower extremity edema, +2 palpable RP bilaterally       Last Labs:  Recent Labs     02/15/24  0802 11/20/23  1059 09/27/23  1200   WBC 6.0 6.0 5.8   HGB 12.7* 12.3* 12.7*   HCT 40.5* 38.7* 40.0*    175 163   MCV 96 96 96     Recent Labs     01/15/24  1447 11/20/23  1059 09/27/23  1200    141 141   K 3.9 4.3 4.8    102 101   BUN 24* 29* 29*   CREATININE 1.26 1.37* 1.36*     CMP -  Lab Results   Component Value Date    CALCIUM 8.8 01/15/2024    PHOS 3.3 01/15/2024    PROT 5.5 (L) " 03/15/2022    ALBUMIN 3.6 01/15/2024    AST 22 03/15/2022    ALT 32 09/27/2023    ALKPHOS 38 03/15/2022    BILITOT 0.8 03/15/2022       LIPID PANEL -   Lab Results   Component Value Date    CHOL 106 09/27/2023    HDL 44.8 09/27/2023    LDLF 46 09/27/2023    TRIG 78 09/27/2023       Lab Results   Component Value Date     (H) 11/06/2019    HGBA1C 6.8 (H) 02/15/2024       Last Cardiology Tests:  ECG:  Obtained and reviewed EKG- normal sinus rhythm HR 77    Echo:  Echo Results:  CONCLUSIONS:  1. Left ventricular systolic function is normal with a 65-70% estimated ejection fraction.  2. Spectral Doppler shows an impaired relaxation pattern of left ventricular diastolic filling.  3. The left atrium is moderately dilated.  4. There is moderate mitral annular calcification.  5. Aortic valve appears abnormal.  6. Mild to moderate aortic valve stenosis.  7. There is moderate aortic valve cusp calcification.            Cardiac Imaging:  Echocardiogram  [facility] [place], [address] [place] [address]  Tel [phone] and Fax [phone]    TRANSTHORACIC ECHOCARDIOGRAM REPORT    Patient Name:     CASEY HAQUE Reading Physician:   [id number] Toñito Izquierdo MD  Study Date:       9/12/2023      Referring Physician: TOÑITO IZQUIERDO  MRN/PID:          [id number]       PCP:  Accession/Order#: [id number]   Department Location: [facility]  YOB: 1935       Fellow:  Gender:           M              Nurse:  Admit Date:       9/12/2023      Sonographer:         Jose Adkins [facility]  Admission Status: Outpatient     Additional Staff:  Height:           170.18 cm      CC Report to:  Weight:           84.82 kg       Study Type:          Echocardiogram  BSA:              1.97 m2  Blood Pressure: 118 /60 mmHg    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    Study Detail: The following Echo studies were performed: 2D, M-Mode, Doppler and  color flow. Technically challenging study due to body habitus.    PHYSICIAN INTERPRETATION:  Left Ventricle: The left ventricular systolic function is normal, with an estimated ejection fraction of 65-70%. The left ventricular cavity size is normal. There is mild concentric left ventricular hypertrophy. Spectral Doppler shows an impaired relaxation pattern of left ventricular diastolic filling.  Left Atrium: The left atrium is moderately dilated.  Right Ventricle: The right ventricle is normal in size. There is normal right ventricular global systolic function.  Right Atrium: The right atrium is normal in size.  Aortic Valve: The aortic valve appears abnormal. The number of aortic cusps could not be determined from this study. There is moderate aortic valve cusp calcification. There is evidence of mild to moderate aortic valve stenosis.  The aortic valve dimensionless index is 0.57. There is trivial aortic valve regurgitation. The peak instantaneous gradient of the aortic valve is 17.3 mmHg. The mean gradient of the aortic valve is 8.3 mmHg.  Mitral Valve: The mitral valve is mildly thickened. There is moderate mitral annular calcification. There is trace to mild mitral valve regurgitation.  Tricuspid Valve: The tricuspid valve is structurally normal. There is trace tricuspid regurgitation.  Pulmonic Valve: The pulmonic valve is not well visualized. The pulmonic valve regurgitation was not well visualized.  Pericardium: There is no pericardial effusion noted.  Aorta: The aortic root is normal.  In comparison to the previous echocardiogram(s): Compared with study from 3/24/2021, There is less mitral regurgitation.    CONCLUSIONS:  1. Left ventricular systolic function is normal with a 65-70% estimated ejection fraction.  2. Spectral Doppler shows an impaired relaxation pattern of left ventricular diastolic filling.  3. The  left atrium is moderately dilated.  4. There is moderate mitral annular calcification.  5. Aortic valve appears abnormal.  6. Mild to moderate aortic valve stenosis.  7. There is moderate aortic valve cusp calcification.    QUANTITATIVE DATA SUMMARY:  2D MEASUREMENTS:  Normal Ranges:  LAs:           4.58 cm    (2.7-4.0cm)  IVSd:          1.22 cm    (0.6-1.1cm)  LVPWd:         1.29 cm    (0.6-1.1cm)  LVIDd:         4.46 cm    (3.9-5.9cm)  LVIDs:         2.71 cm  LV Mass Index: 105.7 g/m2  LV % FS        39.2 %    LA VOLUME:  Normal Ranges:  LA Vol A4C:        75.5 ml    (22+/-6mL/m2)  LA Vol A2C:        80.3 ml  LA Vol BP:         82.6 ml  LA Vol Index A4C:  38.4ml/m2  LA Vol Index A2C:  40.9 ml/m2  LA Vol Index BP:   42.0 ml/m2  LA Area A4C:       22.3 cm2  LA Area A2C:       24.4 cm2  LA Major Axis A4C: 5.6 cm  LA Major Axis A2C: 6.3 cm  LA Volume Index:   42.0 ml/m2  LA Vol A4C:        69.5 ml  LA Vol A2C:        76.6 ml    M-MODE MEASUREMENTS:  Normal Ranges:  LAs: 4.10 cm (2.7-4.0cm)    AORTA MEASUREMENTS:  Normal Ranges:  Ao Sinus, d: 3.20 cm (2.1-3.5cm)  Ao STJ, d:   2.70 cm (1.7-3.4cm)  Asc Ao, d:   3.40 cm (2.1-3.4cm)    LV SYSTOLIC FUNCTION BY 2D PLANIMETRY (MOD):  Normal Ranges:  EF-A4C View: 75.7 % (>=55%)  EF-A2C View: 52.4 %  EF-Biplane:  66.0 %    LV DIASTOLIC FUNCTION:  Normal Ranges:  MV Peak E:        0.90 m/s    (0.7-1.2 m/s)  MV Peak A:        0.94 m/s    (0.42-0.7 m/s)  E/A Ratio:        0.95        (1.0-2.2)  MV e'             0.06 m/s    (>8.0)  MV lateral e'     0.04 m/s  MV medial e'      0.06 m/s  MV A Dur:         115.34 msec  E/e' Ratio:       14.94       (<8.0)  PulmV Sys Nam:    44.62 cm/s  PulmV Blackburn Nam:   26.91 cm/s  PulmV S/D Nam:    1.66  PulmV A Revs Nam: 27.78 cm/s  PulmV A Revs Dur: 96.89 msec    MITRAL VALVE:  Normal Ranges:  MV Vmax:    0.93 m/s (<=1.3m/s)  MV peak PG: 3.4 mmHg (<5mmHg)  MV mean P.7 mmHg (<2mmHg)  MV VTI:     22.51 cm (10-13cm)  MV DT:      192 msec  (150-240msec)    AORTIC VALVE:  Normal Ranges:  AoV Vmax:                2.08 m/s  (<=1.7m/s)  AoV Peak P.3 mmHg (<20mmHg)  AoV Mean P.3 mmHg  (1.7-11.5mmHg)  LVOT Max Nam:            1.11 m/s  (<=1.1m/s)  AoV VTI:                 40.69 cm  (18-25cm)  LVOT VTI:                23.38 cm  LVOT Diameter:           1.98 cm   (1.8-2.4cm)  AoV Area, VTI:           1.78 cm2  (2.5-5.5cm2)  AoV Area,Vmax:           1.65 cm2  (2.5-4.5cm2)  AoV Dimensionless Index: 0.57    RIGHT VENTRICLE:  RV Basal 3.50 cm  RV Mid   2.20 cm  RV Major 7.7 cm  TAPSE:   27.0 mm    PULMONIC VALVE:  Normal Ranges:  PV Accel Time: 129 msec (>120ms)  PV Max Nam:    1.3 m/s  (0.6-0.9m/s)  PV Max P.8 mmHg    Pulmonary Veins:  PulmV A Revs Dur: 96.89 msec  PulmV A Revs Nam: 27.78 cm/s  PulmV Blackburn Nam:   26.91 cm/s  PulmV S/D Nam:    1.66  PulmV Sys Nam:    44.62 cm/s    [id number] Neo Bass MD  Electronically signed on 9/15/2023 at 8:46:47 AM          Assessment/Plan   Gregory was seen today for follow-up.  Diagnoses and all orders for this visit:  Chronic diastolic congestive heart failure (CMS/HCC) (Primary)  -     Basic metabolic panel; Future  Hypertension, unspecified type  Hyperlipidemia, unspecified hyperlipidemia type  Atherosclerosis of native coronary artery of native heart without angina pectoris      In summary, Mr. Tai is a pleasant 88 year-old white male with a past medical history significant for coronary artery disease status post PCI Ã--2 in  in the setting of a myocardial infarction with preserved LV function, hypertension, hyperlipidemia, carotid atherosclerosis with a questionable remote TIA, COPD, chronic aspiration, prior perioperative DVT/PE, prior tobacco use, aortic stenosis, chronic diastolic heart failure, type II non-insulin-requiring diabetes, and peripheral arterial disease. He was not had any further episodes of disorientation or facial drooping. He has appointment with  neurologist later this month. He has noted occasional orthostatic lightheadedness. His orthostatic blood pressure were negative today. His blood pressure is soft. Blood work done today showed renal function stable. I did speak to his wife on the phone instructing her to have patient decrease Metoprolol tartrate from 25 mg to 12.5 mg twice a day. His recent event monitor showed brief SVT. I have ordered blood work as above. He appears euvolemic by clinical exam. He will continue current cardiovascular medications. He will follow up as scheduled. He will call sooner with concerns.                Orders:  No orders of the defined types were placed in this encounter.     Followup Appts:  Future Appointments   Date Time Provider Department Center   4/18/2024  1:30 PM Duy Kim OD [id number] [facility]   4/25/2024  1:00 PM Jelani Hawk MD [id number] [facility]   5/8/2024  8:00 AM TWINS VASC [id number] [facility]   5/8/2024  9:00 AM TWINS VASC [id number] [facility]   5/16/2024 10:00 AM Renuka Clifton APRN-CNP [id number] [facility]   6/6/2024  9:45 AM Suellen Waite MD [id number] [facility]   9/17/2024 10:00 AM [facility] ECHO 1 [id number] [facility]   9/17/2024 11:20 AM Neo Bass MD [id number] [facility]   10/3/2024  9:45 AM Suellen Waite MD [id number] [facility]           ____________________________________________________________  MOSES Antunez-CNP  [facility]  [facility]

## 2024-04-18 ENCOUNTER — OFFICE VISIT (OUTPATIENT)
Dept: OPHTHALMOLOGY | Facility: CLINIC | Age: 89
End: 2024-04-18
Payer: MEDICARE

## 2024-04-18 DIAGNOSIS — H52.203 ASTIGMATISM OF BOTH EYES WITH PRESBYOPIA: ICD-10-CM

## 2024-04-18 DIAGNOSIS — H35.371 EPIRETINAL MEMBRANE (ERM) OF RIGHT EYE: Primary | ICD-10-CM

## 2024-04-18 DIAGNOSIS — H52.4 ASTIGMATISM OF BOTH EYES WITH PRESBYOPIA: ICD-10-CM

## 2024-04-18 DIAGNOSIS — M35.01 KERATITIS SICCA, BILATERAL (MULTI): ICD-10-CM

## 2024-04-18 PROCEDURE — 83516 IMMUNOASSAY NONANTIBODY: CPT | Performed by: OPTOMETRIST

## 2024-04-18 PROCEDURE — 92134 CPTRZ OPH DX IMG PST SGM RTA: CPT | Performed by: OPTOMETRIST

## 2024-04-18 PROCEDURE — 68761 CLOSE TEAR DUCT OPENING: CPT | Mod: RIGHT SIDE | Performed by: OPTOMETRIST

## 2024-04-18 PROCEDURE — 92014 COMPRE OPH EXAM EST PT 1/>: CPT | Performed by: OPTOMETRIST

## 2024-04-18 PROCEDURE — 92015 DETERMINE REFRACTIVE STATE: CPT | Performed by: OPTOMETRIST

## 2024-04-18 PROCEDURE — 83861 MICROFLUID ANALY TEARS: CPT | Performed by: OPTOMETRIST

## 2024-04-18 ASSESSMENT — VISUAL ACUITY
OS_CC: 20/25
OD_CC: 20/30
METHOD: SNELLEN - LINEAR
OS_CC+: -1
OD_CC+: -2
CORRECTION_TYPE: GLASSES

## 2024-04-18 ASSESSMENT — SCHIRMERS TEST
OD_SCHIRMERS: 4
OS_SCHIRMERS: 6

## 2024-04-18 ASSESSMENT — ENCOUNTER SYMPTOMS
ALLERGIC/IMMUNOLOGIC NEGATIVE: 0
GASTROINTESTINAL NEGATIVE: 0
NEUROLOGICAL NEGATIVE: 0
CONSTITUTIONAL NEGATIVE: 0
EYES NEGATIVE: 1
HEMATOLOGIC/LYMPHATIC NEGATIVE: 0
ENDOCRINE NEGATIVE: 0
RESPIRATORY NEGATIVE: 0
MUSCULOSKELETAL NEGATIVE: 0
PSYCHIATRIC NEGATIVE: 0
CARDIOVASCULAR NEGATIVE: 0

## 2024-04-18 ASSESSMENT — CONF VISUAL FIELD
OS_SUPERIOR_TEMPORAL_RESTRICTION: 0
OS_INFERIOR_TEMPORAL_RESTRICTION: 0
OS_SUPERIOR_NASAL_RESTRICTION: 0
OD_INFERIOR_NASAL_RESTRICTION: 0
OS_NORMAL: 1
OD_SUPERIOR_TEMPORAL_RESTRICTION: 0
OD_INFERIOR_TEMPORAL_RESTRICTION: 0
OD_NORMAL: 1
OS_INFERIOR_NASAL_RESTRICTION: 0
OD_SUPERIOR_NASAL_RESTRICTION: 0

## 2024-04-18 ASSESSMENT — REFRACTION_MANIFEST
OD_ADD: +3.00
OS_CYLINDER: -1.75
OD_CYLINDER: -1.25
OS_SPHERE: +1.25
OD_AXIS: 135
OS_AXIS: 080
OD_SPHERE: +1.00
OS_ADD: +3.00

## 2024-04-18 ASSESSMENT — CUP TO DISC RATIO
OD_RATIO: .3
OS_RATIO: .3

## 2024-04-18 ASSESSMENT — REFRACTION_WEARINGRX
SPECS_TYPE: BIFOCAL
OS_AXIS: 080
OD_AXIS: 135
OD_SPHERE: +1.50
OS_ADD: +3.00
OS_CYLINDER: -1.50
OS_SPHERE: +1.75
OD_ADD: +3.00
OD_CYLINDER: -1.50

## 2024-04-18 ASSESSMENT — TONOMETRY
OD_IOP_MMHG: 12
IOP_METHOD: GOLDMANN APPLANATION
OS_IOP_MMHG: 14

## 2024-04-18 ASSESSMENT — EXTERNAL EXAM - RIGHT EYE: OD_EXAM: NORMAL

## 2024-04-18 ASSESSMENT — EXTERNAL EXAM - LEFT EYE: OS_EXAM: NORMAL

## 2024-04-18 NOTE — PROGRESS NOTES
VA improved with new glasses and 20/30 was 20/25 OD 20/20   was 20/20 OS.  Macular scarring outside of NICOLE OD and drusen at edge of macula as well.  ERM OD.     Optical coherence tomography of the macula revealed:    OD: Flattened foveal contour with ERM visible, nl photoreceptor, retinal pigment epithelium, IS/OS junction, mild submacular drusen visualized as well, central field 330 was 335 was 338 was 333 was 328 was 338 micron.  Vitreous base NOT visualized.   OS:  Normal foveal contour, photoreceptor, retinal pigment epithelium, IS/OS junction, mild submacular drusen visualized as well, central field 268 was 270 was 270 was 264 was 270 was 272 micron.  Vitreous base NOT visualized.      Mild glaucoma suspect and Optical coherence tomography of the retinal nerve fiber layer (RNFL) revealed:    OD: Normal thickness in all four sectors with an average RNFL thickness of 91 micron.   OS: Normal thickness in all four sectors with an average RNFL thickness of 86 micron.     Testing to evaluate the presence of dry eye disease (DED) was performed today and provided the following results:   The ocular surface disease index questionnaire (OSDI) score was  (scale: 0-12 = normal, 13-22 = mild, 23-32 = moderate, >33 = severe): 21 was 36 was 21 was 14   TearLab, a test for tear film osmolarity revealed (normal <300 mOsm/L, mild 300-320, moderate 320-340, severe >340 mOsm/L, inter eye difference of >8 mOsm/L is considered abnormal as well)   OD:  below range was 313 was 328 was 307 mOsm/L   OS:   295 was 341 was 307 was 314 mOsm/L   Inflammadry, a test for the DED specific MMP-9 inhibitor:   OD: positive was positive was  positive was strong positive   OS:  positive was positive was positive was positive   Schirmer`s test with anesthetic, testing basal tear production (0-5 = severe, 6-10 = moderate, 11-15 = mild):   OD: 4 was 0 was 4 was 5 mm between 1-6 minutes   OS: 6 was 1 was 1 was 7 mm between 1-6 minutes   Tear break up  time (TBUT), a measure of tear stability (0-5 = severe, 6-10 = moderate, 11-15 = mild)   OD: 0 was 3 seconds   OS:  0 was 3 seconds   Corneal punctate epithelial erosions (PEE) staining with sodium fluorescein score (5 zones, each PEE level 0-3, maximum score = 15)   OD: NIH PEE score: +1INT was +1 I PEE was +1 I  Central PEE absent   OS: NIH PEE score: +1 INT was +1 I PEE was +1 I  Central PEE absent   Most dry eye tests reveal moderate to severe dry eye.  Stay with current regimen: restasis 1-2x day OU and ATs BID OU. Inserted Eagle plug 0.6mm Lot [id number] in  RLL and LLL, 0.6mm plug fit easily, Fell out RLL and LLL Inserted 0.7mm lot [id number] RLL went in with room for 0.8 mm LLL went in very snug. LLL also has mild eversion of punctum and mild early ectropion.   DED and persistent corneal mild PEE`s OU.  However, patient feels he is doing well enough using Restasis and AT BID.  To continue this way.       A spectacle prescription was dispensed to be used as needed. Trivex for protection OS.     Current regimen doing well. restasis 2 times a day, and systane once a day. He uses the Ravi mask once a day for about 1 hour CPM discussed adding Miebo PRN. Add Pataday OTC BID during allergy season.     Rtc 6 months for all dry eye tests and manifest refraction (MR) and no DFE and OCT macula.

## 2024-04-25 ENCOUNTER — LAB (OUTPATIENT)
Dept: LAB | Facility: LAB | Age: 89
End: 2024-04-25
Payer: MEDICARE

## 2024-04-25 ENCOUNTER — OFFICE VISIT (OUTPATIENT)
Dept: NEUROLOGY | Facility: CLINIC | Age: 89
End: 2024-04-25
Payer: MEDICARE

## 2024-04-25 VITALS
BODY MASS INDEX: 29.35 KG/M2 | DIASTOLIC BLOOD PRESSURE: 63 MMHG | WEIGHT: 187 LBS | SYSTOLIC BLOOD PRESSURE: 107 MMHG | HEIGHT: 67 IN | HEART RATE: 86 BPM

## 2024-04-25 DIAGNOSIS — R40.4 TRANSIENT ALTERATION OF AWARENESS: Primary | ICD-10-CM

## 2024-04-25 DIAGNOSIS — R29.6 RECURRENT FALLS: ICD-10-CM

## 2024-04-25 PROCEDURE — 36415 COLL VENOUS BLD VENIPUNCTURE: CPT

## 2024-04-25 PROCEDURE — 99205 OFFICE O/P NEW HI 60 MIN: CPT | Performed by: PSYCHIATRY & NEUROLOGY

## 2024-04-25 PROCEDURE — 3074F SYST BP LT 130 MM HG: CPT | Performed by: PSYCHIATRY & NEUROLOGY

## 2024-04-25 PROCEDURE — 1160F RVW MEDS BY RX/DR IN RCRD: CPT | Performed by: PSYCHIATRY & NEUROLOGY

## 2024-04-25 PROCEDURE — 1157F ADVNC CARE PLAN IN RCRD: CPT | Performed by: PSYCHIATRY & NEUROLOGY

## 2024-04-25 PROCEDURE — 1159F MED LIST DOCD IN RCRD: CPT | Performed by: PSYCHIATRY & NEUROLOGY

## 2024-04-25 PROCEDURE — 3078F DIAST BP <80 MM HG: CPT | Performed by: PSYCHIATRY & NEUROLOGY

## 2024-04-25 PROCEDURE — 82607 VITAMIN B-12: CPT

## 2024-04-25 NOTE — PATIENT INSTRUCTIONS
We discussed that the basis for your recurrent episodes of loss of awareness is uncertain but they might represent seizures.  You do have a past history of seizure.    I recommend a brain MRI and an EEG.  I am also checking a blood test pertinent to your recurrent falls.    I will call with study results.    In the meantime, as we discussed repeatedly in the office today, I advised that you NOT DRIVE until further notice.  We discussed the serious consequences of having an episode of loss of awareness while driving.    We will determine further evaluation and management steps and timing of your next office visit after the above studies are completed.

## 2024-04-25 NOTE — PROGRESS NOTES
"Subjective     Gregory Tai is a 89 y.o. year old right handed male presenting as a new patient for episodes of altered awareness, recurrent falls, remote history of seizure.    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    He is evaluated in the office today as a new patient referred by Rosie CONNELLY, Cardiology.  He is accompanied to the visit by his wife.    He presents because of a few episodes that occurred in October and November 2023.  His wife describes the first episode as characterized by him having \"no control over his limbs\" while sitting in the kitchen chair, and he indicates that at that time he was unable to use utensils or do other activities such as brushing his teeth.  Apparently this lack of control lasted for a matter of hours although the patient was aware of it.  It had not resolved by the time he went to bed that night but was resolved by the next morning.    One week later while he was using his nebulizer his wife heard the mask fly off and came in to find him staring, glassy eyed, slurring his speech and with facial droop and disorientation.  He was very clammy.  He was not mentally processing things that she said to him.  She helped him over to the bed and he sat down and asked for water and a Kind bar.  He was able to eat and drink and then he went to lie down for about 45 minutes after which she was improved.  He did not want EMS called.  Of note, this episode occurred shortly after his Lasix dose was increased.    His wife also describes unwitnessed falls, approximately 4 total over the past several months.  On one of these " "occasions she heard a thud.  He was conscious when she found him but very clammy, and this has been the case each time.  Apparently one of these episodes occurred while he was wearing a cardiac monitor but it is not clear what it showed.  The patient indicates that he simply tripped on the carpet.    As above, he has a remote history of seizure including an episode described as \"grand mal\" in 2013 and episodes of \"blacking out\" since roughly 2005.  He has not been on anticonvulsant medication (apart from gabapentin prescribed for other indications) recently.  He does drive.    He is at baseline ambulatory without assistive devices.  His wife indicates that he chronically drags his right leg when he walks but he refuses to use a cane.    He endorses intermittent sharp pain involving the right more than left parietal region, which is momentary.    He endorses intermittent lightheadedness regardless of position, and his wife indicates sometimes these episodes are associated with a glassy eyed appearance of variable duration.  His wife indicates in fact that for a period of time at a recent appointment with his PCP he sat unresponsive with his eyes closed.    He has not undergone recent head imaging.  I reviewed a brain MRI from 9/28/2016 which appears to be his most recent.  It shows negative diffusion sequence.  No abnormal enhancement.  There is a focus of signal abnormality following CSF on all sequences in the right high lateral cord at the C1-2 level corresponding to his surgical history as noted above.      Also reviewed the report of a carotid ultrasound from 12/21/2023 showing less than 50% left ICA stenosis, greater than 70% right proximal ICA stenosis.  Antegrade flow in both vertebral arteries.    He has not undergone EEG recently.        Review of Systems    Review of Systems:  Neurologic:  As per the history of present illness.  Constitutional:  Negative for fevers, chills, or weight loss, positive for " fatigue.  Musculoskeletal: Positive for neck and back pain. Cardiovascular:  Negative for chest pain or palpitations.  Respiratory: Positive for cough and wheezing.  Eyes:  Negative for vision loss or diplopia.  ENT: Positive for chronic hearing loss and tinnitus.  GI:  Negative for bowel incontinence, positive for constipation.  : Positive for bladder incontinence.  Dermatologic:  Negative for rash.        Patient Active Problem List   Diagnosis    Anemia    Hypertension    PAD (peripheral artery disease) (CMS-[facility])    Benign prostatic hyperplasia    Bilateral carotid artery stenosis    Bilateral occipital neuralgia    Obstructive sleep apnea syndrome    Cervical myelopathy (Multi)    Chronic CHF (Multi)    Chronic obstructive pulmonary disease, group C, by Global Initiative for Chronic Obstructive Lung Disease 2013 classification (Multi)    CKD (chronic kidney disease) stage 3, GFR 30-59 ml/min (Multi)    Controlled diabetes mellitus with circulatory complication (Multi)    Atherosclerosis of native coronary artery of native heart with angina pectoris (CMS-HCC)    Mixed simple and mucopurulent chronic bronchitis (Multi)    Nonrheumatic aortic valve stenosis    Reactive airway disease ([facility])    Recurrent major depressive disorder (CMS-HCC)    Keratitis sicca, bilateral (Multi)    Actinic keratosis    Angioma    Heart palpitations    Gallbladder stone with nonacute cholecystitis    Esophageal web ([facility])    Esophageal reflux    Esophageal dysphagia    Epiretinal membrane (ERM) of right eye    Elevated serum creatinine    Edema of extremities    Ear canal dryness, bilateral    Dizziness and giddiness    Disorder of prostate    Diffuse esophageal spasm    Depression    Chronic pain    Concussion with brief LOC    Cervical postlaminectomy syndrome    Chronic cough    Bilateral sensorineural hearing loss    Balanitis    Auditory discrimination impairment, bilateral    Aspiration into airway    Anxiety    AMD  (age-related macular degeneration), bilateral    Trigger ring finger of left hand    Trigger middle finger of left hand    Senile purpura (CMS-HCC)    Arthropathy    Sun-damaged skin    Condition not found    Extrapyramidal disease and abnormal movement disorder    History of coronary artery stent placement    Hyperlipidemia    Hyponatremia    Impaired fasting blood sugar    Impairment of balance    Left carpal tunnel syndrome    Left inguinal hernia    Lumbar stenosis    Lung nodule    Mixed incontinence    MRSA infection    Myalgia    Neuropathy    Obesity, Class I, BMI 30-34.9    Pseudophakia of both eyes    Restless legs syndrome    Smell disorder    Spondylosis without myelopathy or radiculopathy, sacral and sacrococcygeal region    Syncope    Tinea cruris    Urgency of urination    Hemangioma, capillary    Atheroscler of native artery of right leg with intermit claudication (CMS-HCC)    Atherosclerotic peripheral vascular disease (CMS-HCC)    Bilateral dry eyes    Central sleep apnea    Cervical cord myelomalacia (Multi)    Severe persistent asthma, uncomplicated (Multi)    Other pulmonary embolism without acute cor pulmonale, unspecified chronicity (Multi)    Astigmatism of both eyes with presbyopia [H52.203, H52.4]     Past Medical History:   Diagnosis Date    Anesthesia of skin     Numbness    Chronic obstructive pulmonary disease with (acute) exacerbation (Multi) 02/19/2013    Obstructive chronic bronchitis with exacerbation    Disease of stomach and duodenum, unspecified     Stomach problems    Dizziness and giddiness     Lightheadedness    Essential (primary) hypertension 09/06/2022    Hypertension, unspecified type    Orchitis 02/19/2013    Orchitis    Other conditions influencing health status 02/19/2013    Meatal stenosis    Other pulmonary embolism without acute cor pulmonale (Multi) 06/23/2013    Pulmonary embolism    Other specified soft tissue disorders 06/23/2013    Limb swelling    Other  spondylosis with myelopathy, cervical region 06/23/2013    Cervical spondylosis with myelopathy    Personal history of other diseases of male genital organs     History of prostate disorder    Personal history of other diseases of the circulatory system 08/26/2020    History of peripheral vascular disease    Personal history of other diseases of the musculoskeletal system and connective tissue     History of arthritis    Personal history of other diseases of the nervous system and sense organs     History of macular degeneration    Personal history of other specified conditions     History of urinary incontinence    Urinary tract infection, site not specified 02/19/2013    Urinary tract infection     Past Surgical History:   Procedure Laterality Date    CATARACT EXTRACTION  11/27/2017    Cataract Surgery    CHOLECYSTECTOMY  11/27/2017    Cholecystectomy    COLONOSCOPY  02/06/2014    Colonoscopy (Fiberoptic)    CT ANGIO NECK  3/12/2013    CT NECK ANGIO W AND WO IV [place]AST [place] [facility] [place]    CT AORTA AND BILATERAL ILIOFEMORAL RUNOFF ANGIOGRAM W AND/OR WO IV CONTRAST  6/21/2022    CT AORTA AND BILATERAL ILIOFEMORAL RUNOFF ANGIOGRAM W AND/OR WO IV CONTRAST 6/21/2022 [facility]    CT AORTA AND BILATERAL ILIOFEMORAL RUNOFF ANGIOGRAM W AND/OR WO IV CONTRAST  7/10/2023    CT AORTA AND BILATERAL ILIOFEMORAL RUNOFF ANGIOGRAM W AND/OR WO IV CONTRAST 7/10/2023 [facility]    OTHER SURGICAL HISTORY  07/22/2013    Arthrodesis Cervical    OTHER SURGICAL HISTORY  12/10/2013    Surgery    OTHER SURGICAL HISTORY  07/09/2019    Cardiac catheterization with stent placement    OTHER SURGICAL HISTORY  10/02/2020    Blepharoplasty    TONSILLECTOMY  02/03/2014    Tonsillectomy With Adenoidectomy     Social History     Tobacco Use    Smoking status: Former     Types: Cigarettes    Smokeless tobacco: Never   Substance Use Topics    Alcohol use: Not Currently     family history includes Blood clot in his mother; Cancer in  his brother and father; Dementia in his mother; Glaucoma in his mother and sister; Hypertension in his mother and sister; Prostate cancer in his father.    Current Outpatient Medications:     acetaminophen (Tylenol) 325 mg tablet, Take 1-2 tablets (325-650 mg) by mouth every 6 hours., Disp: , Rfl:     aspirin 81 mg EC tablet, Take 1 tablet (81 mg) by mouth once daily., Disp: , Rfl:     atorvastatin (Lipitor) 80 mg tablet, Take 1 tablet (80 mg) by mouth once daily at bedtime., Disp: 90 tablet, Rfl: 3    benzonatate (Tessalon) 200 mg capsule, Take 1 capsule (200 mg) by mouth 3 times a day as needed., Disp: , Rfl:     cycloSPORINE (Restasis) 0.05 % ophthalmic emulsion, Administer 1 drop into both eyes 2 times a day., Disp: , Rfl:     escitalopram (Lexapro) 10 mg tablet, Take 1 tablet (10 mg) by mouth once daily., Disp: 90 tablet, Rfl: 3    famotidine (Pepcid) 20 mg tablet, Take 1 tablet (20 mg) by mouth 2 times a day., Disp: 180 tablet, Rfl: 3    furosemide (Lasix) 40 mg tablet, Take 1 tablet (40 mg) by mouth 2 times a day. On Tuesday and Saturday take 2 tablets (80 mg) twice daily per Dr. Oconnell, Disp: , Rfl:     gabapentin (Neurontin) 300 mg capsule, Take 1 capsule (300 mg) by mouth 2 times a day., Disp: 180 capsule, Rfl: 3    ipratropium-albuteroL (Duo-Neb) 0.5-2.5 mg/3 mL nebulizer solution, Take 3 mL by nebulization once daily., Disp: , Rfl:     isosorbide mononitrate ER (Imdur) 60 mg 24 hr tablet, TAKE 1 TABLET BY MOUTH EVERY MORNING, Disp: 90 tablet, Rfl: 3    metoprolol tartrate (Lopressor) 25 mg tablet, TAKE 1 TABLET BY MOUTH EVERY 12 HOURS, Disp: 180 tablet, Rfl: 3    montelukast (Singulair) 10 mg tablet, Take 1 tablet (10 mg) by mouth once daily., Disp: , Rfl:     pseudoephedrine-guaifenesin (Mucinex D)  mg 12 hr tablet, Take by mouth every 12 hours., Disp: , Rfl:     Spiriva with HandiHaler 18 mcg inhalation capsule, INHALE CONTENTS OF 1 CAPSULE EVERY DAY, Disp: , Rfl:     spironolactone (Aldactone) 25  mg tablet, Take 1 tablet (25 mg) by mouth once daily., Disp: 90 tablet, Rfl: 3  Allergies   Allergen Reactions    Adhesive Tape-Silicones Unknown    Amoxicillin Diarrhea    Amoxicillin-Pot Clavulanate Unknown    Azelastine Unknown    Beconase Aq [Beclomethasone Diprop (Aq)] Other     Nose bleed     Clopidogrel Bisulfate Unknown    Cromolyn Swelling and Other     NASALCROM - NOSE BLEEDS    Cromolyn Sodium Other    Fluticasone Propionate Unknown    Guaifenesin Unknown    Levofloxacin In D5w Unknown    Nasalcrom A Swelling     (nose)    Oxcarbazepine Unknown    Oxycodone-Acetaminophen Swelling and Other     Anxiety, unable to sleep, agitation     Paxil [Paroxetine Hcl] Other     Agitation, Jittery     Phenylephrine-Guaifenesin Swelling and Other    Phenylpropanolamine Hcl Unknown    Pseudoephedrine Other     ENTEX LA - JITTERY; Agitation      Sulfabenzamide Unknown    Tolterodine Swelling     swelling of hands    Venlafaxine Swelling and Other     Behavior changes     Ace Inhibitors Rash     Dry cough    Corticosteroids (Glucocorticoids) GI bleeding, Rash, Swelling and Other     swelling - BECONASE    Heavy nose bleed    Epinephrine Rash, Swelling and Other     Tachycardia    Karaya Gum Rash    Levofloxacin Palpitations, Rash, Other and Dizziness     Irritability     Olopatadine Rash, Swelling and Other     Serious sinus infection    Oxybutynin Chloride Rash and Other    Paroxetine Swelling, Palpitations and Other    Phenylpropanolamine Nausea And Vomiting    Sulfamethoxazole-Trimethoprim Swelling, Rash, Other and Headache     Severe headache, neck pain, aseptic meningitis       Objective   Neurological Exam  Physical Exam    Physical Examination:    General: Alert man who was ambulatory without assistive devices.      Cardiovascular: Carotid auscultation revealed no bruits.    Mental Status: Clear sensorium without fluctuation.  Appropriate in conversation.  Oriented to self, date and day of the week, suburb, street  and floor.  Recent and remote recall intact for details of medical history.  Attention and concentration intact during interview.  Fund of knowledge fair for medical information.  Language intact and fluent without paraphasic errors.    Cranial Nerves:  Funduscopic exam was not well visualized bilaterally on nondilated exam.  Pupils were equal, round and reactive to light with no relative afferent pupillary defect.  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  No nystagmus.  No ptosis.  Visual fields were full to confrontation tested binocularly.  Facial sensation was symmetric to pin.  Facial motor function was symmetrically intact.  Moderately hard of hearing with bilateral hearing aids in place.  No dysarthria.  Shoulder shrug was symmetric.  Tongue protrusion was midline.    Motor: Muscle tone was normal about the knees and paratonic in the upper extremities.  There was no pronator drift.  Finger taps were faster on the left.  Confrontation strength was 5/5 throughout.    Coordination: Finger to finger and alternating hand movements were rapid and accurate without dysmetria.  Repetitive hand movements were faster on the right.  There was no tremor, myoclonus or dystonic posturing.    Tendon Reflexes: Symmetrically 2+ patellar, absent ankles, neutral plantars.  Right biceps and brachioradialis were 3+.  Left biceps was trace and left brachioradialis 1+.  Triceps was 2+ right and trace left.    Sensation: Vibration thresholds were normal at the index fingers.  Vibration perception was absent at the great toes, present at the right medial malleolus, absent at the left.  Romberg sign was absent.    Station: Intact and stable.    Gait: Stable without assistance and notable for a mildly halting/limping appearance, relatively diminished right arm swing, moderate pace.  He was unable to tandem.  He did not freeze or  adali.      Assessment/Plan     This gentleman has history of episodes of altered awareness raising question of seizure, but associated on at least some occasions with clamminess raising question of vagally mediated syncope or presyncope instead.  He is also followed by cardiology.    I think he warrants a repeat brain MRI and an EEG.  I will contact the patient regarding study results.  I also recommended checking a vitamin B12 level.    I advised him not to drive until further notice and I discussed this recommendation repeatedly in the presence of his wife.    We will determine further evaluation and management steps and timing of his next office visit after the above evaluations have been completed.

## 2024-04-26 LAB — VIT B12 SERPL-MCNC: 676 PG/ML (ref 211–911)

## 2024-05-07 ENCOUNTER — LAB (OUTPATIENT)
Dept: LAB | Facility: LAB | Age: 89
End: 2024-05-07
Payer: MEDICARE

## 2024-05-07 DIAGNOSIS — R73.9 HYPERGLYCEMIA, UNSPECIFIED: ICD-10-CM

## 2024-05-07 DIAGNOSIS — I10 ESSENTIAL (PRIMARY) HYPERTENSION: ICD-10-CM

## 2024-05-07 DIAGNOSIS — N40.1 BENIGN PROSTATIC HYPERPLASIA WITH LOWER URINARY TRACT SYMPTOMS: ICD-10-CM

## 2024-05-07 DIAGNOSIS — I25.10 ATHEROSCLEROTIC HEART DISEASE OF NATIVE CORONARY ARTERY WITHOUT ANGINA PECTORIS: Primary | ICD-10-CM

## 2024-05-07 DIAGNOSIS — N18.30 CHRONIC KIDNEY DISEASE, STAGE 3 UNSPECIFIED (MULTI): ICD-10-CM

## 2024-05-07 DIAGNOSIS — E55.9 VITAMIN D DEFICIENCY, UNSPECIFIED: ICD-10-CM

## 2024-05-07 LAB
25(OH)D3 SERPL-MCNC: 45 NG/ML (ref 30–100)
ALBUMIN SERPL BCP-MCNC: 3.6 G/DL (ref 3.4–5)
ANION GAP SERPL CALC-SCNC: 10 MMOL/L (ref 10–20)
APPEARANCE UR: ABNORMAL
BACTERIA #/AREA URNS AUTO: ABNORMAL /HPF
BILIRUB UR STRIP.AUTO-MCNC: NEGATIVE MG/DL
BUN SERPL-MCNC: 31 MG/DL (ref 6–23)
CALCIUM SERPL-MCNC: 8.8 MG/DL (ref 8.6–10.3)
CHLORIDE SERPL-SCNC: 101 MMOL/L (ref 98–107)
CO2 SERPL-SCNC: 33 MMOL/L (ref 21–32)
COLOR UR: YELLOW
CREAT SERPL-MCNC: 1.17 MG/DL (ref 0.5–1.3)
EGFRCR SERPLBLD CKD-EPI 2021: 60 ML/MIN/1.73M*2
GLUCOSE SERPL-MCNC: 102 MG/DL (ref 74–99)
GLUCOSE UR STRIP.AUTO-MCNC: NEGATIVE MG/DL
HYALINE CASTS #/AREA URNS AUTO: ABNORMAL /LPF
KETONES UR STRIP.AUTO-MCNC: NEGATIVE MG/DL
LEUKOCYTE ESTERASE UR QL STRIP.AUTO: ABNORMAL
MUCOUS THREADS #/AREA URNS AUTO: ABNORMAL /LPF
NITRITE UR QL STRIP.AUTO: NEGATIVE
PH UR STRIP.AUTO: 6 [PH]
PHOSPHATE SERPL-MCNC: 4.1 MG/DL (ref 2.5–4.9)
POTASSIUM SERPL-SCNC: 4.2 MMOL/L (ref 3.5–5.3)
PROT UR STRIP.AUTO-MCNC: NEGATIVE MG/DL
RBC # UR STRIP.AUTO: NEGATIVE /UL
RBC #/AREA URNS AUTO: ABNORMAL /HPF
SODIUM SERPL-SCNC: 140 MMOL/L (ref 136–145)
SP GR UR STRIP.AUTO: 1.01
UROBILINOGEN UR STRIP.AUTO-MCNC: <2 MG/DL
WBC #/AREA URNS AUTO: >50 /HPF
WBC CLUMPS #/AREA URNS AUTO: ABNORMAL /HPF

## 2024-05-07 PROCEDURE — 83970 ASSAY OF PARATHORMONE: CPT

## 2024-05-07 PROCEDURE — 80069 RENAL FUNCTION PANEL: CPT

## 2024-05-07 PROCEDURE — 82306 VITAMIN D 25 HYDROXY: CPT

## 2024-05-07 PROCEDURE — 81001 URINALYSIS AUTO W/SCOPE: CPT

## 2024-05-07 PROCEDURE — 36415 COLL VENOUS BLD VENIPUNCTURE: CPT

## 2024-05-07 PROCEDURE — 83036 HEMOGLOBIN GLYCOSYLATED A1C: CPT

## 2024-05-08 ENCOUNTER — HOSPITAL ENCOUNTER (OUTPATIENT)
Dept: VASCULAR MEDICINE | Facility: CLINIC | Age: 89
Discharge: HOME | End: 2024-05-08
Payer: MEDICARE

## 2024-05-08 DIAGNOSIS — I73.9 PVD (PERIPHERAL VASCULAR DISEASE) WITH CLAUDICATION (CMS-HCC): ICD-10-CM

## 2024-05-08 DIAGNOSIS — I73.9 PAD (PERIPHERAL ARTERY DISEASE) (CMS-HCC): ICD-10-CM

## 2024-05-08 DIAGNOSIS — I65.23 CAROTID STENOSIS, BILATERAL: ICD-10-CM

## 2024-05-08 DIAGNOSIS — I65.21 CAROTID ARTERY STENOSIS WITHOUT CEREBRAL INFARCTION, RIGHT: ICD-10-CM

## 2024-05-08 LAB
EST. AVERAGE GLUCOSE BLD GHB EST-MCNC: 143 MG/DL
HBA1C MFR BLD: 6.6 %
PTH-INTACT SERPL-MCNC: 107 PG/ML (ref 18.5–88)

## 2024-05-08 PROCEDURE — 93922 UPR/L XTREMITY ART 2 LEVELS: CPT

## 2024-05-08 PROCEDURE — 93880 EXTRACRANIAL BILAT STUDY: CPT | Performed by: SURGERY

## 2024-05-08 PROCEDURE — 93922 UPR/L XTREMITY ART 2 LEVELS: CPT | Performed by: SURGERY

## 2024-05-08 PROCEDURE — 93880 EXTRACRANIAL BILAT STUDY: CPT | Mod: MUE

## 2024-05-16 ENCOUNTER — OFFICE VISIT (OUTPATIENT)
Dept: VASCULAR SURGERY | Facility: CLINIC | Age: 89
End: 2024-05-16
Payer: MEDICARE

## 2024-05-16 VITALS
WEIGHT: 188 LBS | DIASTOLIC BLOOD PRESSURE: 56 MMHG | HEIGHT: 67 IN | HEART RATE: 86 BPM | OXYGEN SATURATION: 94 % | BODY MASS INDEX: 29.51 KG/M2 | SYSTOLIC BLOOD PRESSURE: 113 MMHG

## 2024-05-16 DIAGNOSIS — I65.21 CAROTID ARTERY STENOSIS WITHOUT CEREBRAL INFARCTION, RIGHT: Primary | ICD-10-CM

## 2024-05-16 DIAGNOSIS — I73.9 PAD (PERIPHERAL ARTERY DISEASE) (CMS-HCC): ICD-10-CM

## 2024-05-16 PROCEDURE — 1159F MED LIST DOCD IN RCRD: CPT | Performed by: NURSE PRACTITIONER

## 2024-05-16 PROCEDURE — 99214 OFFICE O/P EST MOD 30 MIN: CPT | Performed by: NURSE PRACTITIONER

## 2024-05-16 PROCEDURE — 3078F DIAST BP <80 MM HG: CPT | Performed by: NURSE PRACTITIONER

## 2024-05-16 PROCEDURE — 1125F AMNT PAIN NOTED PAIN PRSNT: CPT | Performed by: NURSE PRACTITIONER

## 2024-05-16 PROCEDURE — 3074F SYST BP LT 130 MM HG: CPT | Performed by: NURSE PRACTITIONER

## 2024-05-16 PROCEDURE — 1157F ADVNC CARE PLAN IN RCRD: CPT | Performed by: NURSE PRACTITIONER

## 2024-05-16 PROCEDURE — 1160F RVW MEDS BY RX/DR IN RCRD: CPT | Performed by: NURSE PRACTITIONER

## 2024-05-16 RX ORDER — OLOPATADINE HYDROCHLORIDE 1 MG/ML
1 SOLUTION/ DROPS OPHTHALMIC 2 TIMES DAILY
COMMUNITY

## 2024-05-16 ASSESSMENT — ENCOUNTER SYMPTOMS
PALPITATIONS: 0
HEMATOLOGIC/LYMPHATIC NEGATIVE: 1
PSYCHIATRIC NEGATIVE: 1
EYES NEGATIVE: 1
CHEST TIGHTNESS: 0
BACK PAIN: 1
ACTIVITY CHANGE: 0
APPETITE CHANGE: 0
SHORTNESS OF BREATH: 0
UNEXPECTED WEIGHT CHANGE: 0
ALLERGIC/IMMUNOLOGIC NEGATIVE: 1
GASTROINTESTINAL NEGATIVE: 1
NEUROLOGICAL NEGATIVE: 1
ENDOCRINE NEGATIVE: 1

## 2024-05-16 ASSESSMENT — COLUMBIA-SUICIDE SEVERITY RATING SCALE - C-SSRS
1. IN THE PAST MONTH, HAVE YOU WISHED YOU WERE DEAD OR WISHED YOU COULD GO TO SLEEP AND NOT WAKE UP?: NO
2. HAVE YOU ACTUALLY HAD ANY THOUGHTS OF KILLING YOURSELF?: NO
6. HAVE YOU EVER DONE ANYTHING, STARTED TO DO ANYTHING, OR PREPARED TO DO ANYTHING TO END YOUR LIFE?: NO

## 2024-05-16 ASSESSMENT — PAIN SCALES - GENERAL: PAINLEVEL: 6

## 2024-05-16 NOTE — PATIENT INSTRUCTIONS
It was a pleasure taking care of you today and appreciate your seeing us at our [facility].     Today's plan is as follows:  1) Your carotid and vascular leg studies remain stable today  2) Continue taking 81mg of aspirin and 90mg of atorvastatin  3) Continue to walk and stay as mobile as possible  4) Follow-up in 1 year with a carotid ultrasound and KAROL prior to appt    Please call the office with any questions at [phone].   You can speak to our secretaries or our clinical nurses for specific questions.   For [facility] specific questions, you can also call [phone] or email at [email or web address]  If you need coordinating your appointments and testing you can do these at the  or by calling my office shortly after your visit.

## 2024-05-16 NOTE — PROGRESS NOTES
F/U REASON: carotid artery stenosis and PAD    CURRENT ENCOUNTER:  Gregory Tai is 89 y.o. male here for follow up of carotid artery stenosis and PAD    Has some numbness in the right leg at times. Notes some claudication in the right leg at times, although he also reports lower back, hip and knee pain.     Denies any lightheadedness, dizziness, or recent falls    No recent stroke like symptoms    Previous tobacco use, quit in 1997    He has no complaints    No wounds or ulcers    History:   85-year-old male with a very challenging hospitalization in 2013. He had severe MRSA infection after cervical neck surgery. He developed a right calf vein DVT. He had a filter placed by Dr. Syed that was then subsequently removed. He FU for specific issue related to his leg     he has chronic limb swelling right greater than left. This is due to venous insufficiency. He is able to get on compression hose--10-15 mmHg occasionally. In addition, he has known SFA stenosis.  Previous ABIs acceptable. He denies claudication symptoms. He is a bit debilitated and gets leg fatigue on Both sides. I explained that this is multifactorial--likely musculoskeletal and related to his cardiopulmonary reserve. Thus we will elect to follow this.      carotid stenosis. needs surveillance. will consider intervention if >80%. However, he may not be interested in any surgery     Your carotid studies remains stable.  right ICA 70% stenosis and Left ICA 50-69% stenosis  asymptomatic  increased s/s of right leg claudication   spinal injections not helping       Meds:     Current Outpatient Medications:     acetaminophen (Tylenol) 325 mg tablet, Take 1-2 tablets (325-650 mg) by mouth every 6 hours., Disp: , Rfl:     aspirin 81 mg EC tablet, Take 1 tablet (81 mg) by mouth once daily., Disp: , Rfl:     atorvastatin (Lipitor) 80 mg tablet, Take 1 tablet (80 mg) by mouth once daily at bedtime., Disp: 90 tablet, Rfl: 3    benzonatate (Tessalon) 200 mg  capsule, Take 1 capsule (200 mg) by mouth 3 times a day as needed., Disp: , Rfl:     cycloSPORINE (Restasis) 0.05 % ophthalmic emulsion, Administer 1 drop into both eyes 2 times a day., Disp: , Rfl:     escitalopram (Lexapro) 10 mg tablet, Take 1 tablet (10 mg) by mouth once daily., Disp: 90 tablet, Rfl: 3    famotidine (Pepcid) 20 mg tablet, Take 1 tablet (20 mg) by mouth 2 times a day., Disp: 180 tablet, Rfl: 3    furosemide (Lasix) 40 mg tablet, Take 1 tablet (40 mg) by mouth 2 times a day. On Tuesday and Saturday take 2 tablets (80 mg) twice daily per Dr. Oconnell, Disp: , Rfl:     gabapentin (Neurontin) 300 mg capsule, Take 1 capsule (300 mg) by mouth 2 times a day., Disp: 180 capsule, Rfl: 3    ipratropium-albuteroL (Duo-Neb) 0.5-2.5 mg/3 mL nebulizer solution, Take 3 mL by nebulization once daily., Disp: , Rfl:     isosorbide mononitrate ER (Imdur) 60 mg 24 hr tablet, TAKE 1 TABLET BY MOUTH EVERY MORNING, Disp: 90 tablet, Rfl: 3    metoprolol tartrate (Lopressor) 25 mg tablet, TAKE 1 TABLET BY MOUTH EVERY 12 HOURS, Disp: 180 tablet, Rfl: 3    montelukast (Singulair) 10 mg tablet, Take 1 tablet (10 mg) by mouth once daily., Disp: , Rfl:     olopatadine (Patanol) 0.1 % ophthalmic solution, 1 drop 2 times a day., Disp: , Rfl:     pseudoephedrine-guaifenesin (Mucinex D)  mg 12 hr tablet, Take by mouth every 12 hours., Disp: , Rfl:     Spiriva with HandiHaler 18 mcg inhalation capsule, INHALE CONTENTS OF 1 CAPSULE EVERY DAY, Disp: , Rfl:     spironolactone (Aldactone) 25 mg tablet, Take 1 tablet (25 mg) by mouth once daily., Disp: 90 tablet, Rfl: 3    Allergies:   Allergies   Allergen Reactions    Adhesive Tape-Silicones Unknown    Amoxicillin Diarrhea    Amoxicillin-Pot Clavulanate Unknown    Azelastine Unknown    Beconase Aq [Beclomethasone Diprop (Aq)] Other     Nose bleed     Clopidogrel Bisulfate Unknown    Cromolyn Swelling and Other     NASALCROM - NOSE BLEEDS    Cromolyn Sodium Other    Fluticasone  Propionate Unknown    Guaifenesin Unknown    Levofloxacin In D5w Unknown    Nasalcrom A Swelling     (nose)    Oxcarbazepine Unknown    Oxycodone-Acetaminophen Swelling and Other     Anxiety, unable to sleep, agitation     Paxil [Paroxetine Hcl] Other     Agitation, Jittery     Phenylephrine-Guaifenesin Swelling and Other    Phenylpropanolamine Hcl Unknown    Pseudoephedrine Other     ENTEX LA - JITTERY; Agitation      Sulfabenzamide Unknown    Tolterodine Swelling     swelling of hands    Venlafaxine Swelling and Other     Behavior changes     Ace Inhibitors Rash     Dry cough    Corticosteroids (Glucocorticoids) GI bleeding, Rash, Swelling and Other     swelling - BECONASE    Heavy nose bleed    Epinephrine Rash, Swelling and Other     Tachycardia    Karaya Gum Rash    Levofloxacin Palpitations, Rash, Other and Dizziness     Irritability     Olopatadine Rash, Swelling and Other     Serious sinus infection    Oxybutynin Chloride Rash and Other    Paroxetine Swelling, Palpitations and Other    Phenylpropanolamine Nausea And Vomiting    Sulfamethoxazole-Trimethoprim Swelling, Rash, Other and Headache     Severe headache, neck pain, aseptic meningitis       ROS:  Review of Systems   Constitutional:  Negative for activity change, appetite change and unexpected weight change.   HENT: Negative.     Eyes: Negative.    Respiratory:  Negative for chest tightness and shortness of breath.    Cardiovascular:  Negative for chest pain and palpitations.   Gastrointestinal: Negative.    Endocrine: Negative.    Genitourinary: Negative.    Musculoskeletal:  Positive for back pain.   Skin: Negative.    Allergic/Immunologic: Negative.    Neurological: Negative.    Hematological: Negative.    Psychiatric/Behavioral: Negative.       otherwise unremarkable    Objective:  Vitals:  Vitals:    05/16/24 1001   BP: 113/56   Pulse: 86   SpO2: 94%        Exam:  both carotids normal upstroke without bruits, radial pulses normal, aorta normal in  size without enlargement, femoral artery pulses normal  Physical Exam  HENT:      Head: Normocephalic and atraumatic.      Nose: Nose normal.      Mouth/Throat:      Mouth: Mucous membranes are moist.   Eyes:      Conjunctiva/sclera: Conjunctivae normal.   Cardiovascular:      Rate and Rhythm: Normal rate.      Pulses: Normal pulses.   Pulmonary:      Effort: Pulmonary effort is normal.   Abdominal:      General: Abdomen is flat.   Musculoskeletal:         General: Normal range of motion.      Cervical back: Normal range of motion.   Skin:     General: Skin is warm and dry.      Capillary Refill: Capillary refill takes less than 2 seconds.   Neurological:      General: No focal deficit present.      Mental Status: He is alert and oriented to person, place, and time.   Psychiatric:         Mood and Affect: Mood normal.         Behavior: Behavior normal.         Thought Content: Thought content normal.         Judgment: Judgment normal.     Labs:  Lab Results   Component Value Date    WBC 6.0 02/15/2024    WBC 6.0 2023    WBC 5.8 2023    HGB 12.7 (L) 02/15/2024    HGB 12.3 (L) 2023    HGB 12.7 (L) 2023    HCT 40.5 (L) 02/15/2024    HCT 38.7 (L) 2023    HCT 40.0 (L) 2023    MCV 96 02/15/2024    MCV 96 2023    MCV 96 2023     02/15/2024     Lab Results   Component Value Date    CREATININE 1.17 2024    CREATININE 1.29 2024    CREATININE 1.26 01/15/2024    BUN 31 (H) 2024    BUN 29 (H) 2024    BUN 24 (H) 01/15/2024     2024     2024     01/15/2024    K 4.2 2024    K 4.3 2024    K 3.9 01/15/2024     2024     2024     01/15/2024    CO2 33 (H) 2024    CO2 32 2024    CO2 32 01/15/2024       [facility] Vascular US Carotid Artery Duplex Bilateral   CONCLUSIONS:  Right Carotid: Findings are consistent with greater than 70% stenosis of the right proximal internal  carotid artery. Turbulent flow seen by color Doppler. Right external carotid artery appears patent with no evidence of stenosis. The right vertebral artery is patent with antegrade flow. No evidence of hemodynamically significant stenosis in the right subclavian artery.  Left Carotid: Findings are consistent with less than 50% stenosis of the left proximal internal carotid artery. Left external carotid artery appears patent with no evidence of stenosis. The left vertebral artery is patent with antegrade flow. No evidence of hemodynamically significant stenosis in the left subclavian artery.     Comparison:  Compared with study from 12/21/2023, no significant change.    5/8/24 Vascular US Ankle Brachial Index (KAE) Without Exercise   CONCLUSIONS:  Right Lower PVR: Evidence of moderate arterial occlusive disease in the right lower extremity at rest. Normal digital perfusion noted. Monophasic flow is noted in the right dorsalis pedis artery and right posterior tibial artery. Biphasic flow is noted in the right common femoral artery.  Left Lower PVR: Evidence of mild arterial occlusive disease in the left lower extremity at rest. Normal digital perfusion noted. Biphasic flow is noted in the left common femoral artery, left posterior tibial artery and left dorsalis pedis artery.     Comparison:  Compared with study from 5/17/2023, No significant change noted on the right. Left kae is down from prior 0.88, however no change in disease category.    Assessment & Plan:  Gregory Tai is 89 y.o. male with history of carotid artery stenosis and PAD who is presents for annual follow-up    Today's plan is as follows:  1) Your carotid and vascular leg studies remain stable today  2) Continue taking 81mg of aspirin and 90mg of atorvastatin  3) Continue to walk and stay as mobile as possible  4) Follow-up in 1 year with a carotid ultrasound and KAE prior to appt    Miguelina Mendez, JASWANT, APRN-CNP, AGPCNP-C, FNP-C, AGACNP-BC  Senior  Nurse Practitioner, Vascular Surgery  [facility], [facility] [place]  [facility]  [address] [address], Office [phone]

## 2024-05-21 ENCOUNTER — HOSPITAL ENCOUNTER (OUTPATIENT)
Dept: NEUROLOGY | Facility: HOSPITAL | Age: 89
Discharge: HOME | End: 2024-05-21
Payer: MEDICARE

## 2024-05-21 ENCOUNTER — HOSPITAL ENCOUNTER (OUTPATIENT)
Dept: RADIOLOGY | Facility: HOSPITAL | Age: 89
Discharge: HOME | End: 2024-05-21
Payer: MEDICARE

## 2024-05-21 DIAGNOSIS — R40.4 TRANSIENT ALTERATION OF AWARENESS: ICD-10-CM

## 2024-05-21 PROCEDURE — 95819 EEG AWAKE AND ASLEEP: CPT | Performed by: STUDENT IN AN ORGANIZED HEALTH CARE EDUCATION/TRAINING PROGRAM

## 2024-05-21 PROCEDURE — 70551 MRI BRAIN STEM W/O DYE: CPT | Performed by: RADIOLOGY

## 2024-05-21 PROCEDURE — 70551 MRI BRAIN STEM W/O DYE: CPT

## 2024-05-21 PROCEDURE — 95819 EEG AWAKE AND ASLEEP: CPT

## 2024-05-22 ENCOUNTER — TELEPHONE (OUTPATIENT)
Dept: NEUROLOGY | Facility: HOSPITAL | Age: 89
End: 2024-05-22
Payer: MEDICARE

## 2024-05-22 NOTE — TELEPHONE ENCOUNTER
I left a message for him just now.    Brain MRI completed yesterday shows a number of findings.  Interval enlargement of the left occipital extra-axial lesion compatible with meningioma compared to the prior study from 2016.  Smaller lesion suspicious for meningioma along the left parietal inner table.  Left CP angle lesion which may have extension into the IAC (radiology unable to determine without gadolinium).    EEG was normal.    I await a call back.  I will recommend neurosurgical consultation regarding what appears to be multiple meningiomas.

## 2024-05-23 NOTE — TELEPHONE ENCOUNTER
Luisa (POA) was returning your call, she saw your message, but would like to still speak with you.  It is best to reach her after 4pm  (I did advise her that you're on call and may not be able to speak to her until next week)

## 2024-05-24 ENCOUNTER — TELEPHONE (OUTPATIENT)
Dept: NEUROLOGY | Facility: HOSPITAL | Age: 89
End: 2024-05-24
Payer: MEDICARE

## 2024-05-24 DIAGNOSIS — D33.3 CEREBELLOPONTINE ANGLE TUMOR (MULTI): Primary | ICD-10-CM

## 2024-05-24 NOTE — TELEPHONE ENCOUNTER
I spoke with his POA Luisa just now regarding the MRI findings and the recommendation to proceed with a contrasted IAC protocol.    I have heard from Dr. Oconnell, nephrology, who indicated that the patient is okay to receive gadolinium.    I have ordered the contrasted IAC protocol which the patient will have done at [facility].  I will call with results.

## 2024-05-29 ENCOUNTER — HOSPITAL ENCOUNTER (OUTPATIENT)
Dept: RADIOLOGY | Facility: HOSPITAL | Age: 89
Discharge: HOME | End: 2024-05-29
Payer: MEDICARE

## 2024-05-29 DIAGNOSIS — D33.3 CEREBELLOPONTINE ANGLE TUMOR (MULTI): ICD-10-CM

## 2024-05-29 PROCEDURE — 2550000001 HC RX 255 CONTRASTS: Performed by: PSYCHIATRY & NEUROLOGY

## 2024-05-29 PROCEDURE — A9575 INJ GADOTERATE MEGLUMI 0.1ML: HCPCS | Performed by: PSYCHIATRY & NEUROLOGY

## 2024-05-29 PROCEDURE — 70553 MRI BRAIN STEM W/O & W/DYE: CPT | Performed by: RADIOLOGY

## 2024-05-29 PROCEDURE — 70553 MRI BRAIN STEM W/O & W/DYE: CPT

## 2024-05-29 RX ORDER — GADOTERATE MEGLUMINE 376.9 MG/ML
0.2 INJECTION INTRAVENOUS
Status: COMPLETED | OUTPATIENT
Start: 2024-05-29 | End: 2024-05-29

## 2024-05-29 RX ADMIN — GADOTERATE MEGLUMINE 17 ML: 376.9 INJECTION INTRAVENOUS at 13:07

## 2024-06-03 ENCOUNTER — TELEPHONE (OUTPATIENT)
Dept: NEUROLOGY | Facility: HOSPITAL | Age: 89
End: 2024-06-03
Payer: MEDICARE

## 2024-06-03 DIAGNOSIS — R40.4 TRANSIENT ALTERATION OF AWARENESS: ICD-10-CM

## 2024-06-03 DIAGNOSIS — D42.9 MULTIPLE MENINGIOMA (MULTI): Primary | ICD-10-CM

## 2024-06-03 DIAGNOSIS — D33.3 VESTIBULAR SCHWANNOMA (MULTI): ICD-10-CM

## 2024-06-03 RX ORDER — LEVETIRACETAM 250 MG/1
125 TABLET ORAL 2 TIMES DAILY
Qty: 90 TABLET | Refills: 2 | Status: SHIPPED | OUTPATIENT
Start: 2024-06-03 | End: 2025-02-28

## 2024-06-03 NOTE — TELEPHONE ENCOUNTER
I spoke with his POSARIKA Moran just now regarding study results from the contrasted IAC protocol MRI.    The left occipital/interhemispheric fissure extra-axial lesion and the small left parietal convexity lesion appear consistent with meningiomas.    The left cerebellopontine angle lesion extends into the IAC and is suggestive of vestibular schwannoma.    She expresses concern both about his cognition and about episodes of loss of awareness.  I discussed that the latter are concerning to be for seizure despite a normal EEG.    I recommended the following:    Referral to neurosurgery (Dr. Hali Sibley) regarding meningiomas.    Referral to otology (Dr. Sherrie Downey) regarding CP angle lesion with IAC extension.    Start Keppra 125 mg twice daily.  Potential for drowsiness discussed.    I discussed that he should not drive.    He should follow-up with me in the office in 8 months.

## 2024-06-06 ENCOUNTER — OFFICE VISIT (OUTPATIENT)
Dept: PRIMARY CARE | Facility: CLINIC | Age: 89
End: 2024-06-06
Payer: MEDICARE

## 2024-06-06 VITALS
TEMPERATURE: 97.6 F | BODY MASS INDEX: 29.41 KG/M2 | WEIGHT: 187.8 LBS | SYSTOLIC BLOOD PRESSURE: 122 MMHG | DIASTOLIC BLOOD PRESSURE: 68 MMHG

## 2024-06-06 DIAGNOSIS — N18.31 STAGE 3A CHRONIC KIDNEY DISEASE (MULTI): ICD-10-CM

## 2024-06-06 DIAGNOSIS — E11.59 CONTROLLED TYPE 2 DIABETES MELLITUS WITH OTHER CIRCULATORY COMPLICATION, WITHOUT LONG-TERM CURRENT USE OF INSULIN (MULTI): ICD-10-CM

## 2024-06-06 DIAGNOSIS — D32.9 MENINGIOMA (MULTI): ICD-10-CM

## 2024-06-06 DIAGNOSIS — D33.3 CPA (CEREBELLOPONTINE ANGLE) TUMOR (MULTI): Primary | ICD-10-CM

## 2024-06-06 DIAGNOSIS — I50.9 CHRONIC CONGESTIVE HEART FAILURE, UNSPECIFIED HEART FAILURE TYPE (MULTI): ICD-10-CM

## 2024-06-06 DIAGNOSIS — J44.9: ICD-10-CM

## 2024-06-06 PROBLEM — E66.9 OBESITY, CLASS I, BMI 30-34.9: Status: RESOLVED | Noted: 2018-11-06 | Resolved: 2024-06-06

## 2024-06-06 PROBLEM — E66.811 OBESITY, CLASS I, BMI 30-34.9: Status: RESOLVED | Noted: 2018-11-06 | Resolved: 2024-06-06

## 2024-06-06 PROBLEM — R73.01 IMPAIRED FASTING BLOOD SUGAR: Status: RESOLVED | Noted: 2023-09-13 | Resolved: 2024-06-06

## 2024-06-06 PROCEDURE — 1160F RVW MEDS BY RX/DR IN RCRD: CPT | Performed by: INTERNAL MEDICINE

## 2024-06-06 PROCEDURE — 3074F SYST BP LT 130 MM HG: CPT | Performed by: INTERNAL MEDICINE

## 2024-06-06 PROCEDURE — 1036F TOBACCO NON-USER: CPT | Performed by: INTERNAL MEDICINE

## 2024-06-06 PROCEDURE — 99214 OFFICE O/P EST MOD 30 MIN: CPT | Performed by: INTERNAL MEDICINE

## 2024-06-06 PROCEDURE — 3078F DIAST BP <80 MM HG: CPT | Performed by: INTERNAL MEDICINE

## 2024-06-06 PROCEDURE — 1159F MED LIST DOCD IN RCRD: CPT | Performed by: INTERNAL MEDICINE

## 2024-06-06 PROCEDURE — 1157F ADVNC CARE PLAN IN RCRD: CPT | Performed by: INTERNAL MEDICINE

## 2024-06-06 ASSESSMENT — PATIENT HEALTH QUESTIONNAIRE - PHQ9
1. LITTLE INTEREST OR PLEASURE IN DOING THINGS: NEARLY EVERY DAY
2. FEELING DOWN, DEPRESSED OR HOPELESS: NEARLY EVERY DAY
SUM OF ALL RESPONSES TO PHQ9 QUESTIONS 1 AND 2: 6

## 2024-06-06 NOTE — PROGRESS NOTES
"Subjective   Reason for Visit: Gregory Tai is an 89 y.o. male here for f/u      Past Medical, Surgical, and Family History reviewed and updated in chart.    Reviewed all medications by prescribing practitioner or clinical pharmacist (such as prescriptions, OTCs, herbal therapies and supplements) and documented in the medical record.    HPI  OVN 2/6/24 reviewed.  No further episodes  Working w/ neuro, recentl started on Keppra for possible sz, found w/ Abnl naima MRI.   Ongoing issue w/ pain, rt leg.  Likely claudication +/- neurogenic.  Working w/ vasc and pain med  #1 lt arm pain- remains an issue, increased x 1-2 weeks.  Constant.   #2 DM- diet \"ok\", little exercise  #3 BPH- ok UO.  Minimal nocturia  #4 CAD- no CP   #5 COPD/chronic bronchitis/LARON- no sig change  #6 depression- good    Patient Care Team:  Suellen Waite MD as PCP - General  Suellen Waite MD as PCP - [facility] ACO Attributed Provider  MOSES Antunez-CNP as Nurse Practitioner (Cardiology)  Neo Bass MD as Consulting Physician (Cardiology)     Review of Systems   All other systems reviewed and are negative.      Objective   Vitals:  /68 (BP Location: Right arm, Patient Position: Sitting, BP Cuff Size: Adult)   Temp 36.4 °C (97.6 °F)   Wt 85.2 kg (187 lb 12.8 oz)   BMI 29.41 kg/m²       Physical Exam  Constitutional:       Appearance: Normal appearance.   Neurological:      Mental Status: He is alert.     Lab Results   Component Value Date    WBC 6.0 02/15/2024    HGB 12.7 (L) 02/15/2024    HCT 40.5 (L) 02/15/2024     02/15/2024    CHOL 106 09/27/2023    TRIG 78 09/27/2023    HDL 44.8 09/27/2023    ALT 32 09/27/2023    AST 22 03/15/2022     05/07/2024    K 4.2 05/07/2024     05/07/2024    CREATININE 1.17 05/07/2024    BUN 31 (H) 05/07/2024    CO2 33 (H) 05/07/2024    TSH 1.89 11/20/2023    INR 1.0 11/06/2019    HGBA1C 6.6 (H) 05/07/2024        MR IAC w and wo IV contrast  Status: Final result     PACS Images     " Show images for MR IAC w and wo IV contrast  Signed by    Signed Time Phone Pager   Nirmala Erazo MD 5/29/2024 14:47 [phone]     Exam Information    Status Exam Begun Exam Ended   Final 5/29/2024 13:06 5/29/2024 13:30     Study Result    Narrative & Impression   Interpreted By:  Nirmala Erazo,   STUDY:  MR IAC W AND WO IV CONTRAST;  5/29/2024 1:30 pm      INDICATION:  Signs/Symptoms:Nodular lesion in the left cerebellopontine angle  possibly extending into auditory canal.  Radiology recommends  contrasted IAC protocol MRI.  Nephrology indicates patient is safe to  receive gadolinium..      COMPARISON:  09/06/2016..      ACCESSION NUMBER(S):  [id number]      ORDERING CLINICIAN:  JESSE BURRELL      TECHNIQUE:  T2,weighted images of brain were acquired without intravenous  contrast administration. Precontrast high-resolution T1 weighted and  T2 weighted images were acquired through the region of internal  auditory canals. Post contrast T1 weighted images of the whole brain,  high-resolution T1 coronal images through the internal auditory  canals and fat saturated T1 axial images through the internal  auditory canals were acquired after administration of 15 cc Dotarem  gadolinium based intravenous contrast.      FINDINGS:  CSF Spaces: There is prominence of the ventricles, cortical sulci and  basal cisterns compatible with age related involutional changes and  mild-to-moderate volume loss. There is no extra-axial fluid  collection.      There is an extra-axial dural-based enhancing mass along the left  interhemispheric falx posteriorly measuring 3.1 x 1.3 cm, increased  compared to the prior exam 09/28/2016, previously measuring 1.3 x 0.4  cm. There is mass effect upon the adjacent brain parenchyma/occipital  lobe without abnormal signal.      There is an extra-axial dural-based enhancing mass along the left  parietal calvarium measuring 1.3 x 0.6 cm. Minimal mass effect upon  the adjacent brain  parenchyma.      Parenchyma: No midline shift. Patchy nonspecific white matter T2 and  FLAIR hyperintense signal is compatible with microangiopathy.      IAC region:  There is an enhancing lesion within the left cerebellar  pontine angle cistern extending into the left internal auditory canal  measuring 1.8 x 0.9 x 0.9 cm suggestive of a vestibular schwannoma.  No abnormal signal or enhancement within the right cerebellar pontine  angle cistern, right internal auditory canal or bilateral membranous  labyrinth.      Paranasal Sinuses and Mastoids: Visualized paranasal sinuses and  mastoid air cells are predominantly clear.      There is a tubular enhancing structure within the superior orbit on  the right which may represent a varix.      IMPRESSION:  1. There is an enhancing lesion within the left cerebellar pontine  angle cistern extending into the left internal auditory canal  suggestive of a vestibular schwannoma.  2. Extra-axial enhancing mass along the left interhemispheric falx  posteriorly has increased in size compared to the prior exam  09/06/2016, and likely represents a meningioma.  3. Extra-axial enhancing lesion along the left parietal calvarium  measures 1.3 x 0.6 cm which likely represents a meningioma.  4. 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 f/u ortho prn  #2 DM- stable. Spent significant time reviewing low sugar, reduced carbohydrate diet with  "exercise. Retest, will hold off rx as a1c <7.5 (goal)  #3 BPH- stable. Followup urology  #4 CAD-stable class I. Follow-up cardiology   #5 COPD/chronic bronchitis/LARON- remains an issue. f/u pulm ([facility]) /infectious disease ([facility]).   #6 depression- stable. b  #7 hyperlipidemia- good  #8 carotid disease - f/u vasc surg [facility]  #9 pulmonary nodule- stable x2 yrs. f/u CT w/ pulm ([facility])  #10 abd wall pain- resolved.  #11 htn- good. 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 f/u surgery.  #13 anemia/mild thrombocytopenia- stable/mild. s/p heme eval--> iron def. Off iron for now.   heme states no follow-up needed except follow-up lab works. retest   #14 cough- stable, but remains a major issue, perhaps a little better. Apparently chronic bronchitis. Follow pulmonology  #15 headache- stable. f/u neuro  #16 back pain/neuropathic pain- s/p epidurals. f/u neuro. strongly rec PT.  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  #19 GB dz- doing well  #20 esophageal spasm/web- Follow-up GI  #21 CTS- reviewed. f/u hand ortho.   #22 CKD- f/u Dr Lucio (neph).   #23 ? sinus issue/smell - resolved  #24 hyponatremia- apparently due to Trileptal plus likely combination of pulmonary disease/CHF. follow-up w/ dr lucio.  #25 pneumonia- resolved. f/u pulm  #26 CHF w/ preserved EF- stable. daily wt's. f/u cards.  #27 CTS   #28 rt LE claudication- f/u  vasc surgery  #29 \"episodes\" (last 11/16/23) - ? Etiology.  No episodes x 2+mths. . ? Sz.  Per neuro on Keppra.  f/u  neuro.  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    rec COVID vaccine ASAP, stressed importance       Patient was identified as a fall risk. Risk prevention instructions provided.  Patient was identified as a fall risk. Risk prevention instructions provided.  "

## 2024-06-17 ENCOUNTER — APPOINTMENT (OUTPATIENT)
Dept: NEUROSURGERY | Facility: HOSPITAL | Age: 89
End: 2024-06-17
Payer: MEDICARE

## 2024-07-11 ENCOUNTER — OFFICE VISIT (OUTPATIENT)
Dept: NEUROSURGERY | Facility: CLINIC | Age: 89
End: 2024-07-11
Payer: MEDICARE

## 2024-07-11 VITALS
DIASTOLIC BLOOD PRESSURE: 59 MMHG | HEIGHT: 67 IN | BODY MASS INDEX: 29.35 KG/M2 | SYSTOLIC BLOOD PRESSURE: 104 MMHG | HEART RATE: 85 BPM | RESPIRATION RATE: 18 BRPM | WEIGHT: 187 LBS

## 2024-07-11 DIAGNOSIS — D32.9 MENINGIOMA (MULTI): Primary | ICD-10-CM

## 2024-07-11 DIAGNOSIS — D42.9 MULTIPLE MENINGIOMA (MULTI): ICD-10-CM

## 2024-07-11 DIAGNOSIS — D33.3 CPA (CEREBELLOPONTINE ANGLE) TUMOR (MULTI): ICD-10-CM

## 2024-07-11 PROCEDURE — 1157F ADVNC CARE PLAN IN RCRD: CPT | Performed by: NURSE PRACTITIONER

## 2024-07-11 PROCEDURE — 99213 OFFICE O/P EST LOW 20 MIN: CPT | Performed by: NURSE PRACTITIONER

## 2024-07-11 PROCEDURE — 3074F SYST BP LT 130 MM HG: CPT | Performed by: NURSE PRACTITIONER

## 2024-07-11 PROCEDURE — 1125F AMNT PAIN NOTED PAIN PRSNT: CPT | Performed by: NURSE PRACTITIONER

## 2024-07-11 PROCEDURE — 1159F MED LIST DOCD IN RCRD: CPT | Performed by: NURSE PRACTITIONER

## 2024-07-11 PROCEDURE — 3078F DIAST BP <80 MM HG: CPT | Performed by: NURSE PRACTITIONER

## 2024-07-11 PROCEDURE — 99203 OFFICE O/P NEW LOW 30 MIN: CPT | Performed by: NURSE PRACTITIONER

## 2024-07-11 ASSESSMENT — PAIN SCALES - GENERAL: PAINLEVEL: 6

## 2024-07-11 NOTE — PROGRESS NOTES
"[facility]  Neurosurgery    History of Present Illness      Gregory Tai is an 89-year-old male with a PMH significant for CAD s/p PCI in 2004 in setting of MI, HTN, HLD, carotid atherosclerosis, chronic diastolic HFpEF (), aortic stenosis, DVT, PE,  COPD, chronic aspiration, history of tobacco use, ASA 81mg therapy, anxiety, depression, C1-C2 mass with cord compression s/p resection 2013, and seizures. Patient was recently seen by Dr. Hawk in neurology for a few episodes of loss of control over his limbs, not able to use utensils, and brushing his teeth. Episodes were lasting for a few hours and would resolve by the next morning. He did develop facial droop and disorientation. Symptoms improved after resting and eating / drinking. Patient was recommended for MR imaging and EEG given concern or seizures vs syncopal episodes. EEG was negative for seizure activity but given symptoms was started on Keppra 125mg BID. MRI concerning with postoperative changes from portion of posterior arch of C1 cerebral body. But he was also found to have interval enlargement of extra-axial dural based lesion of L falx now measuring 31mm x 13mm x 28 mm where it previously measured 13mm x 4mm x 16mm in 2016 with mass effect. There was an additional extra-axial dural based lesion along the L parietal bone 13 x 6mm, nodular lesion within the L CPA measuring 11 x 10mm. Patient was referred to neurosurgery and neurotology for further evaluation. Patient presents to clinic for NPV.     Following with neurology for possible seizure now on Keppra. Knik with bilateral hearing aides L>R. With recurrent falls and gait instability.           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 Extra-axial  L falx lesion measuring 3.1 x 1.3 which has increased in size from prior imaging in 2016 (1.3 x 0.4), Extra-axial dural based L parietal calvarium measuring 1.3 x 0.6cm.         Assessment & Plan      Diagnosis:  Diagnoses and all orders for this visit:  Meningioma (Multi)  CPA (cerebellopontine angle) tumor (Multi)  Multiple meningioma (Multi)  -     Referral to Neurosurgery          Provider Impression:     Patient is an 89-year-old male presenting for initial evaluation after MR workup significant for L schwannoma and dural based lesion with interval growth of L falx lesion. 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 Now following with neurology for seizure like activity now on Keppra with negative EEG workup.     Discussed with patient given age and co morbidities surgical resection is not recommended at this time. Will have case reviewed at [facility] to see if he would be a candidate for radiation therapy given growth of L falx lesion.     He is scheduled with neuro-otology with Dr. Downey.     Will call patient's wife per request post conference with review of discussion and recommendations. Plan discussed with patient and wife who were in agreement. All questions answered.       Medical History     Past Medical History:   Diagnosis Date    Anesthesia of skin     Numbness    Chronic obstructive pulmonary disease with (acute) exacerbation (Multi) 02/19/2013    Obstructive chronic bronchitis with exacerbation    Disease of stomach and duodenum, unspecified     Stomach problems    Dizziness and giddiness     Lightheadedness    Essential (primary) hypertension 09/06/2022    Hypertension, unspecified type    Orchitis 02/19/2013    Orchitis    Other conditions influencing health status  02/19/2013    Meatal stenosis    Other pulmonary embolism without acute cor pulmonale (Multi) 06/23/2013    Pulmonary embolism    Other specified soft tissue disorders 06/23/2013    Limb swelling    Other spondylosis with myelopathy, cervical region 06/23/2013    Cervical spondylosis with myelopathy    Personal history of other diseases of male genital organs     History of prostate disorder    Personal history of other diseases of the circulatory system 08/26/2020    History of peripheral vascular disease    Personal history of other diseases of the musculoskeletal system and connective tissue     History of arthritis    Personal history of other diseases of the nervous system and sense organs     History of macular degeneration    Personal history of other specified conditions     History of urinary incontinence    Urinary tract infection, site not specified 02/19/2013    Urinary tract infection     Past Surgical History:   Procedure Laterality Date    CATARACT EXTRACTION  11/27/2017    Cataract Surgery    CHOLECYSTECTOMY  11/27/2017    Cholecystectomy    COLONOSCOPY  02/06/2014    Colonoscopy (Fiberoptic)    CT ANGIO NECK  3/12/2013    CT NECK ANGIO W AND WO IV CONTRAST 3/12/2013 [facility] [place] INPATIENT LEGAC[facility]    CT AORTA AND BILATERAL ILIOFEMORAL RUNOFF ANGIOGRAM W AND/OR WO IV CONTRAST  6/21/2022    CT AORTA AND BILATERAL ILIOFEMORAL RUNOFF ANGIOGRAM W AND/OR WO IV CONTRAST 6/21/2022 [facility]    CT AORTA AND BILATERAL ILIOFEMORAL RUNOFF ANGIOGRAM W AND/OR WO IV CONTRAST  7/10/2023    CT AORTA AND BILATERAL ILIOFEMORAL RUNOFF ANGIOGRAM W AND/OR WO IV CONTRAST 7/10/2023 [facility]    OTHER SURGICAL HISTORY  07/22/2013    Arthrodesis Cervical    OTHER SURGICAL HISTORY  12/10/2013    Surgery    OTHER SURGICAL HISTORY  07/09/2019    Cardiac catheterization with stent placement    OTHER SURGICAL HISTORY  10/02/2020    Blepharoplasty    TONSILLECTOMY  02/03/2014    Tonsillectomy With Adenoidectomy     Social History      Tobacco Use    Smoking status: Former     Types: Cigarettes    Smokeless tobacco: Never   Vaping Use    Vaping status: Never Used   Substance Use Topics    Alcohol use: Not Currently    Drug use: Never     Family History   Problem Relation Name Age of Onset    Dementia Mother      Glaucoma Mother      Hypertension Mother      Blood clot Mother          in the brain    Cancer Father      Prostate cancer Father      Glaucoma Sister      Hypertension Sister      Cancer Brother       Allergies   Allergen Reactions    Adhesive Tape-Silicones Unknown    Amoxicillin Diarrhea    Amoxicillin-Pot Clavulanate Unknown    Azelastine Unknown    Beconase Aq [Beclomethasone Diprop (Aq)] Other     Nose bleed     Clopidogrel Bisulfate Unknown    Cromolyn Swelling and Other     NASALCROM - NOSE BLEEDS    Cromolyn Sodium Other    Fluticasone Propionate Unknown    Guaifenesin Unknown    Levofloxacin In D5w Unknown    Nasalcrom A Swelling     (nose)    Oxcarbazepine Unknown    Oxycodone-Acetaminophen Swelling and Other     Anxiety, unable to sleep, agitation     Paxil [Paroxetine Hcl] Other     Agitation, Jittery     Phenylephrine-Guaifenesin Swelling and Other    Phenylpropanolamine Hcl Unknown    Pseudoephedrine Other     ENTEX LA - JITTERY; Agitation      Sulfabenzamide Unknown    Tolterodine Swelling     swelling of hands    Venlafaxine Swelling and Other     Behavior changes     Ace Inhibitors Rash     Dry cough    Corticosteroids (Glucocorticoids) GI bleeding, Rash, Swelling and Other     swelling - BECONASE    Heavy nose bleed    Epinephrine Rash, Swelling and Other     Tachycardia    Karaya Gum Rash    Levofloxacin Palpitations, Rash, Other and Dizziness     Irritability     Olopatadine Rash, Swelling and Other     Serious sinus infection    Oxybutynin Chloride Rash and Other    Paroxetine Swelling, Palpitations and Other    Phenylpropanolamine Nausea And Vomiting    Sulfamethoxazole-Trimethoprim Swelling, Rash, Other and  Headache     Severe headache, neck pain, aseptic meningitis     Current Outpatient Medications   Medication Instructions    acetaminophen (Tylenol) 325 mg tablet 1-2 tablets, oral, Every 6 hours    aspirin 81 mg EC tablet 1 tablet, oral, Daily    atorvastatin (LIPITOR) 80 mg, oral, Nightly    benzonatate (TESSALON) 200 mg, oral, 3 times daily PRN    cycloSPORINE (Restasis) 0.05 % ophthalmic emulsion 1 drop, Both Eyes, 2 times daily    escitalopram (LEXAPRO) 10 mg, oral, Daily    famotidine (PEPCID) 20 mg, oral, 2 times daily    furosemide (LASIX) 40 mg, oral, 2 times daily (morning and late afternoon), On Tuesday and Saturday take 2 tablets (80 mg) twice daily per Dr. Oconnell    gabapentin (NEURONTIN) 300 mg, oral, 2 times daily    ipratropium-albuteroL (Duo-Neb) 0.5-2.5 mg/3 mL nebulizer solution 3 mL, nebulization, Daily    isosorbide mononitrate ER (IMDUR) 60 mg, oral, Daily before breakfast    levETIRAcetam (KEPPRA) 125 mg, oral, 2 times daily    metoprolol tartrate (LOPRESSOR) 25 mg, oral, Every 12 hours    montelukast (SINGULAIR) 10 mg, oral, Daily    olopatadine (Patanol) 0.1 % ophthalmic solution 1 drop, 2 times daily    pseudoephedrine-guaifenesin (Mucinex D)  mg 12 hr tablet oral, Every 12 hours    Spiriva with HandiHaler 18 mcg inhalation capsule INHALE CONTENTS OF 1 CAPSULE EVERY DAY    spironolactone (ALDACTONE) 25 mg, oral, Daily

## 2024-07-17 ENCOUNTER — APPOINTMENT (OUTPATIENT)
Dept: HEMATOLOGY/ONCOLOGY | Facility: HOSPITAL | Age: 89
End: 2024-07-17
Payer: MEDICARE

## 2024-07-17 NOTE — TUMOR BOARD NOTE
[facility] Tumor Board Recommendations       Patient was presented by Hali Santamaria CNP at our [facility] Tumor Board on 07/17/2024 which included representatives from Radiation oncology, Surgical oncology, Neuro-oncology, Pathology, Radiology, Research, Neurosurgery, Social Work (Neurosurgery).     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 P/w few episodes of loss of control over his limbs, not able to use utensils, and brushing his teeth. EEG negative. MRI with postoperative changes from posterior arch of C1 with interval enlargement of extra-axial dural based lesion of L falx now measuring 31mm x 13mm x 28 mm where it previously measured 13mm x 4mm x 16mm in 2016 with mass effect. There was an additional extra-axial dural based lesion along the L parietal bone 13 x 6mm, nodular lesion within the L CPA measuring 11 x 10mm.     The [facility] Tumor Board tumor board considered available treatment options and made the following recommendations: Dotate PET and referral to radiation oncology.     Clinical Trial Status: N/A     National site-specific guidelines were discussed with respect to the case.

## 2024-08-08 ENCOUNTER — TELEPHONE (OUTPATIENT)
Dept: RADIATION ONCOLOGY | Facility: HOSPITAL | Age: 89
End: 2024-08-08
Payer: MEDICARE

## 2024-08-14 ENCOUNTER — HOSPITAL ENCOUNTER (OUTPATIENT)
Dept: RADIATION ONCOLOGY | Facility: HOSPITAL | Age: 89
Setting detail: RADIATION/ONCOLOGY SERIES
Discharge: HOME | End: 2024-08-14
Payer: MEDICARE

## 2024-08-14 VITALS
RESPIRATION RATE: 18 BRPM | SYSTOLIC BLOOD PRESSURE: 128 MMHG | WEIGHT: 192.5 LBS | DIASTOLIC BLOOD PRESSURE: 65 MMHG | HEART RATE: 90 BPM | BODY MASS INDEX: 30.15 KG/M2 | OXYGEN SATURATION: 96 % | TEMPERATURE: 97.5 F

## 2024-08-14 DIAGNOSIS — D33.3 CPA (CEREBELLOPONTINE ANGLE) TUMOR (MULTI): ICD-10-CM

## 2024-08-14 DIAGNOSIS — D32.9 MENINGIOMA (MULTI): Primary | ICD-10-CM

## 2024-08-14 ASSESSMENT — ENCOUNTER SYMPTOMS
RESPIRATORY NEGATIVE: 1
DEPRESSION: 1
SPEECH DIFFICULTY: 1
NUMBNESS: 1
FATIGUE: 1
HEMATOLOGIC/LYMPHATIC NEGATIVE: 1
WHEEZING: 1
EXTREMITY WEAKNESS: 1
BACK PAIN: 1
CONSTITUTIONAL NEGATIVE: 1
LEG SWELLING: 1
BRUISES/BLEEDS EASILY: 1
CONFUSION: 1
COUGH: 1
DIZZINESS: 1
SHORTNESS OF BREATH: 1
APPETITE CHANGE: 1
CHEST TIGHTNESS: 1
LIGHT-HEADEDNESS: 1
SEIZURES: 1
OCCASIONAL FEELINGS OF UNSTEADINESS: 1
CARDIOVASCULAR NEGATIVE: 1
ENDOCRINE NEGATIVE: 1
GASTROINTESTINAL NEGATIVE: 1
LOSS OF SENSATION IN FEET: 1
EYE PROBLEMS: 1
HEADACHES: 1
DECREASED CONCENTRATION: 1

## 2024-08-14 ASSESSMENT — COLUMBIA-SUICIDE SEVERITY RATING SCALE - C-SSRS
6. HAVE YOU EVER DONE ANYTHING, STARTED TO DO ANYTHING, OR PREPARED TO DO ANYTHING TO END YOUR LIFE?: NO
1. IN THE PAST MONTH, HAVE YOU WISHED YOU WERE DEAD OR WISHED YOU COULD GO TO SLEEP AND NOT WAKE UP?: NO
2. HAVE YOU ACTUALLY HAD ANY THOUGHTS OF KILLING YOURSELF?: NO

## 2024-08-14 ASSESSMENT — PATIENT HEALTH QUESTIONNAIRE - PHQ9
1. LITTLE INTEREST OR PLEASURE IN DOING THINGS: SEVERAL DAYS
SUM OF ALL RESPONSES TO PHQ9 QUESTIONS 1 AND 2: 2
2. FEELING DOWN, DEPRESSED OR HOPELESS: SEVERAL DAYS

## 2024-08-14 NOTE — PROGRESS NOTES
Radiation Oncology Nursing Note    Prior Radiotherapy:  No  No radiation treatments to show. (Treatments may have been administered in another system.)     Current Systemic Treatment:  No     Presence of Pacemaker or ICD:  No    History of Autoimmune or Connective Tissue Disorders:  No    Pain: The patient's current pain level was assessed.  They report currently having a pain of 4 out of 10.  They feel their pain is under control with the use of pain medications.    Review of Systems:  Review of Systems   Constitutional:  Positive for appetite change and fatigue.   HENT:   Positive for hearing loss.         Left ear hearing loss   Eyes:  Positive for eye problems.        Floaters, macular degeneration, dry eyes   Respiratory:  Positive for chest tightness, cough, shortness of breath and wheezing.         Light green sputum, recent pulmonology visit     Cardiovascular:  Positive for leg swelling.        Cardiac stents     Gastrointestinal: Negative.    Genitourinary:          Flow start/stop   Musculoskeletal:  Positive for back pain and gait problem.   Neurological:  Positive for dizziness, extremity weakness, gait problem, headaches, light-headedness, numbness, seizures and speech difficulty.   Hematological:  Bruises/bleeds easily.   Psychiatric/Behavioral:  Positive for depression.

## 2024-08-14 NOTE — PROGRESS NOTES
Radiation Oncology Outpatient Consult    Patient Name:  Gregory Tai  MRN:  [id number]  :  1935    Referring Provider: No ref. provider found  Primary Care Provider: Suellen Waite MD  Care Team: Patient Care Team:  Suellen Waite MD as PCP - General  Suellen Waite MD as PCP - MSS[facility] ACO Attributed Provider  MOSES Antunez-CNP as Nurse Practitioner (Cardiology)  Neo Bass MD as Consulting Physician (Cardiology)    Date of Service: 2024     SUBJECTIVE  History of Present Illness:  Gregory Tai is a 89 y.o. 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    Per chart review, the patient presented to the neurology clinic on 2024 with episodes of altered awareness and recurrent falls. At that time, a brain MRI, EEG, and blood tests were ordered.     EEG came back with no concerning seizure activity. Vitamin B12 levels were 676.   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    Patient then had a telephone encounter follow-up with neurology on 2024 and there was a concern about seizure activity despite normal EEG. So patient was started on Keppra 125 mg PO BID, and he was referred to  neurosurgery and otology.     Patient was seen by the neurosurgery team on 07/11/2024 and there was no planned surgical intervention given the patient's medical co-morbidities.     The patient's case was discussed in the CNS tumor board on 07/17/2024 and decision was to proced with Dotatate PET and referral to radiation oncology. The Dotatate PET scan was denied and patient presented today with his POA (Luisa Holbrook) for radiation oncology consult. He is scheduled to see ENT in a couple of days.    Today's interview was performed with the presence of the POA Luisa. The patient and Luisa confirmed the sequence of events as detailed below. Luisa reported that the patient's neurological function such as cognition, ambulation, and awareness have started to decline slowly since October 2023 and eventually prompted medical evaluation in April of this year. She has not noticed any major improvements since starting Keppra, and he is now on Keppra 125 mg once daily instead of twice daily, as the BID dose was causing some sleepiness. There is no active chest pain or shortness of breath or orthopnea, and he is able to assume a flat position. 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 He used hearing aids in both ears and remains with likely no serviceable hearing loss.        Prior Radiotherapy:  No radiation treatments to show. (Treatments may have been administered in another system.)       Past Medical History:    Past Medical History:   Diagnosis Date    Anesthesia of skin     Numbness    Chronic obstructive pulmonary disease with (acute) exacerbation (Multi) 02/19/2013    Obstructive chronic bronchitis with exacerbation    Disease of stomach and duodenum, unspecified     Stomach problems    Dizziness and giddiness     Lightheadedness    Essential (primary) hypertension 09/06/2022    Hypertension, unspecified type    Orchitis 02/19/2013    Orchitis    Other conditions influencing  health status 02/19/2013    Meatal stenosis    Other pulmonary embolism without acute cor pulmonale (Multi) 06/23/2013    Pulmonary embolism    Other specified soft tissue disorders 06/23/2013    Limb swelling    Other spondylosis with myelopathy, cervical region 06/23/2013    Cervical spondylosis with myelopathy    Personal history of other diseases of male genital organs     History of prostate disorder    Personal history of other diseases of the circulatory system 08/26/2020    History of peripheral vascular disease    Personal history of other diseases of the musculoskeletal system and connective tissue     History of arthritis    Personal history of other diseases of the nervous system and sense organs     History of macular degeneration    Personal history of other specified conditions     History of urinary incontinence    Urinary tract infection, site not specified 02/19/2013    Urinary tract infection        Past Surgical History:    Past Surgical History:   Procedure Laterality Date    CATARACT EXTRACTION  11/27/2017    Cataract Surgery    CHOLECYSTECTOMY  11/27/2017    Cholecystectomy    COLONOSCOPY  02/06/2014    Colonoscopy (Fiberoptic)    CT ANGIO NECK  3/12/2013    CT NECK ANGIO W AND WO IV CONTRAST 3/12/2013 [facility]    CT AORTA AND BILATERAL ILIOFEMORAL RUNOFF ANGIOGRAM W AND/OR WO IV CONTRAST  6/21/2022    CT AORTA AND BILATERAL ILIOFEMORAL RUNOFF ANGIOGRAM W AND/OR WO IV CONTRAST 6[facility]21/2022 [facility]    CT AORTA AND BILATERAL ILIOFEMORAL RUNOFF ANGIOGRAM W AND/OR WO IV CONTRAST  7/10/2023    CT AORTA AND BILATERAL ILIOFEMORAL RUNOFF ANGIOGRAM W AND/OR WO IV CONTRAST 7/10/2023 [facility]    OTHER SURGICAL HISTORY  07/22/2013    Arthrodesis Cervical    OTHER SURGICAL HISTORY  12/10/2013    Surgery    OTHER SURGICAL HISTORY  07/09/2019    Cardiac catheterization with stent placement    OTHER SURGICAL HISTORY  10/02/2020    Blepharoplasty    TONSILLECTOMY  02/03/2014     Tonsillectomy With Adenoidectomy        Family History:  Cancer-related family history includes Cancer in his brother and father; Prostate cancer in his father.    Social History:    Social History     Tobacco Use    Smoking status: Former     Types: Cigarettes    Smokeless tobacco: Never   Vaping Use    Vaping status: Never Used   Substance Use Topics    Alcohol use: Not Currently    Drug use: Never       Allergies:    Allergies   Allergen Reactions    Adhesive Tape-Silicones Unknown    Amoxicillin Diarrhea    Amoxicillin-Pot Clavulanate Unknown    Azelastine Unknown    Beconase Aq [Beclomethasone Diprop (Aq)] Other     Nose bleed     Clopidogrel Bisulfate Unknown    Cromolyn Swelling and Other     NASALCROM - NOSE BLEEDS    Cromolyn Sodium Other    Fluticasone Propionate Unknown    Guaifenesin Unknown    Levofloxacin In D5w Unknown    Nasalcrom A Swelling     (nose)    Oxcarbazepine Unknown    Oxycodone-Acetaminophen Swelling and Other     Anxiety, unable to sleep, agitation     Paxil [Paroxetine Hcl] Other     Agitation, Jittery     Phenylephrine-Guaifenesin Swelling and Other    Phenylpropanolamine Hcl Unknown    Pseudoephedrine Other     ENTEX LA - JITTERY; Agitation      Sulfabenzamide Unknown    Tolterodine Swelling     swelling of hands    Venlafaxine Swelling and Other     Behavior changes     Ace Inhibitors Rash     Dry cough    Corticosteroids (Glucocorticoids) GI bleeding, Rash, Swelling and Other     swelling - BECONASE    Heavy nose bleed    Epinephrine Rash, Swelling and Other     Tachycardia    Karaya Gum Rash    Levofloxacin Palpitations, Rash, Other and Dizziness     Irritability     Olopatadine Rash, Swelling and Other     Serious sinus infection    Oxybutynin Chloride Rash and Other    Paroxetine Swelling, Palpitations and Other    Phenylpropanolamine Nausea And Vomiting    Sulfamethoxazole-Trimethoprim Swelling, Rash, Other and Headache     Severe headache, neck pain, aseptic meningitis         Medications:    Current Outpatient Medications:     acetaminophen (Tylenol) 325 mg tablet, Take 1-2 tablets (325-650 mg) by mouth every 6 hours., Disp: , Rfl:     aspirin 81 mg EC tablet, Take 1 tablet (81 mg) by mouth once daily., Disp: , Rfl:     atorvastatin (Lipitor) 80 mg tablet, Take 1 tablet (80 mg) by mouth once daily at bedtime., Disp: 90 tablet, Rfl: 3    benzonatate (Tessalon) 200 mg capsule, Take 1 capsule (200 mg) by mouth 3 times a day as needed., Disp: , Rfl:     cycloSPORINE (Restasis) 0.05 % ophthalmic emulsion, Administer 1 drop into both eyes 2 times a day., Disp: , Rfl:     escitalopram (Lexapro) 10 mg tablet, Take 1 tablet (10 mg) by mouth once daily., Disp: 90 tablet, Rfl: 3    famotidine (Pepcid) 20 mg tablet, Take 1 tablet (20 mg) by mouth 2 times a day., Disp: 180 tablet, Rfl: 3    furosemide (Lasix) 40 mg tablet, Take 1 tablet (40 mg) by mouth 2 times daily (morning and late afternoon). On Tuesday and Saturday take 2 tablets (80 mg) twice daily per Dr. Oconnell, Disp: , Rfl:     gabapentin (Neurontin) 300 mg capsule, Take 1 capsule (300 mg) by mouth 2 times a day., Disp: 180 capsule, Rfl: 3    ipratropium-albuteroL (Duo-Neb) 0.5-2.5 mg/3 mL nebulizer solution, Take 3 mL by nebulization once daily., Disp: , Rfl:     isosorbide mononitrate ER (Imdur) 60 mg 24 hr tablet, TAKE 1 TABLET BY MOUTH EVERY MORNING, Disp: 90 tablet, Rfl: 3    levETIRAcetam (Keppra) 250 mg tablet, Take 0.5 tablets (125 mg) by mouth 2 times a day., Disp: 90 tablet, Rfl: 2    metoprolol tartrate (Lopressor) 25 mg tablet, TAKE 1 TABLET BY MOUTH EVERY 12 HOURS (Patient taking differently: Take 0.5 tablets (12.5 mg) by mouth every 12 hours.), Disp: 180 tablet, Rfl: 3    montelukast (Singulair) 10 mg tablet, Take 1 tablet (10 mg) by mouth once daily., Disp: , Rfl:     olopatadine (Patanol) 0.1 % ophthalmic solution, 1 drop 2 times a day., Disp: , Rfl:     pseudoephedrine-guaifenesin (Mucinex D)  mg 12 hr tablet, Take by  mouth every 12 hours., Disp: , Rfl:     Spiriva with HandiHaler 18 mcg inhalation capsule, INHALE CONTENTS OF 1 CAPSULE EVERY DAY, Disp: , Rfl:     spironolactone (Aldactone) 25 mg tablet, Take 1 tablet (25 mg) by mouth once daily., Disp: 90 tablet, Rfl: 3      Review of Systems:  Review of Systems   Constitutional: Negative.    HENT:   Positive for hearing loss.    Respiratory: Negative.     Cardiovascular: Negative.    Gastrointestinal: Negative.    Endocrine: Negative.    Musculoskeletal:  Positive for back pain and gait problem.   Skin: Negative.    Neurological:  Positive for dizziness, gait problem and light-headedness.   Hematological: Negative.    Psychiatric/Behavioral:  Positive for confusion and decreased concentration.        Performance Status:  The Karnofsky performance scale today is 60, Requires occasional assistance, but is able to care for most of his personal needs (ECOG equivalent 2).        OBJECTIVE  There were no vitals taken for this visit.   Physical Exam  Constitutional:       Appearance: Normal appearance.   HENT:      Head: Normocephalic.      Ears:      Comments: Bilateral hearing aids  Neck:      Comments: Fused cervical spine with limited ROM.  Cardiovascular:      Rate and Rhythm: Regular rhythm.      Pulses: Normal pulses.      Comments: Distant heart sounds  Pulmonary:      Effort: Pulmonary effort is normal.      Breath sounds: Rhonchi present.   Abdominal:      General: Abdomen is flat. Bowel sounds are normal.      Palpations: Abdomen is soft.   Musculoskeletal:         General: Normal range of motion.   Lymphadenopathy:      Cervical: Cervical adenopathy present.   Skin:     General: Skin is warm.   Neurological:      Mental Status: He is alert.      Coordination: Coordination abnormal.      Gait: Gait abnormal.      Comments: Bilateral hearing loss.  Romberg negative.  DTR 2+.     Psychiatric:         Mood and Affect: Mood normal.              Laboratory Review:  There are no  laboratory contraindications to radiation therapy.    The pertinent lab results were reviewed and discussed with the patient.  Notably,    Lab Results   Component Value Date    GLUCOSE 102 (H) 05/07/2024    CALCIUM 8.8 05/07/2024     05/07/2024    K 4.2 05/07/2024    CO2 33 (H) 05/07/2024     05/07/2024    BUN 31 (H) 05/07/2024    CREATININE 1.17 05/07/2024      Lab Results   Component Value Date    WBC 6.0 02/15/2024    HGB 12.7 (L) 02/15/2024    HCT 40.5 (L) 02/15/2024    MCV 96 02/15/2024     02/15/2024        Imaging:  The pertinent imaging results were reviewed and discussed with the patient.  Notably, MR IAC w and wo IV contrast  Narrative: Interpreted By:  Nirmala Erazo,   STUDY:  MR IAC W AND WO IV CONTRAST;  5/29/2024 1:30 pm      INDICATION:  Signs/Symptoms:Nodular lesion in the left cerebellopontine angle  possibly extending into auditory canal.  Radiology recommends  contrasted IAC protocol MRI.  Nephrology indicates patient is safe to  receive gadolinium..      COMPARISON:  09/06/2016..      ACCESSION NUMBER(S):  [id number]      ORDERING CLINICIAN:  JESSE BURRELL      TECHNIQUE:  T2,weighted images of brain were acquired without intravenous  contrast administration. Precontrast high-resolution T1 weighted and  T2 weighted images were acquired through the region of internal  auditory canals. Post contrast T1 weighted images of the whole brain,  high-resolution T1 coronal images through the internal auditory  canals and fat saturated T1 axial images through the internal  auditory canals were acquired after administration of 15 cc Dotarem  gadolinium based intravenous contrast.      FINDINGS:  CSF Spaces: There is prominence of the ventricles, cortical sulci and  basal cisterns compatible with age related involutional changes and  mild-to-moderate volume loss. There is no extra-axial fluid  collection.      There is an extra-axial dural-based enhancing mass along the  left  interhemispheric falx posteriorly measuring 3.1 x 1.3 cm, increased  compared to the prior exam 09/28/2016, previously measuring 1.3 x 0.4  cm. There is mass effect upon the adjacent brain parenchyma/occipital  lobe without abnormal signal.      There is an extra-axial dural-based enhancing mass along the left  parietal calvarium measuring 1.3 x 0.6 cm. Minimal mass effect upon  the adjacent brain parenchyma.      Parenchyma: No midline shift. Patchy nonspecific white matter T2 and  FLAIR hyperintense signal is compatible with microangiopathy.      IAC region:  There is an enhancing lesion within the left cerebellar  pontine angle cistern extending into the left internal auditory canal  measuring 1.8 x 0.9 x 0.9 cm suggestive of a vestibular schwannoma.  No abnormal signal or enhancement within the right cerebellar pontine  angle cistern, right internal auditory canal or bilateral membranous  labyrinth.      Paranasal Sinuses and Mastoids: Visualized paranasal sinuses and  mastoid air cells are predominantly clear.      There is a tubular enhancing structure within the superior orbit on  the right which may represent a varix.      Impression: 1. There is an enhancing lesion within the left cerebellar pontine  angle cistern extending into the left internal auditory canal  suggestive of a vestibular schwannoma.  2. Extra-axial enhancing mass along the left interhemispheric falx  posteriorly has increased in size compared to the prior exam  09/06/2016, and likely represents a meningioma.  3. Extra-axial enhancing lesion along the left parietal calvarium  measures 1.3 x 0.6 cm which likely represents a meningioma.  4. Tubular enhancing structure within the superior orbit on the right  may represent a venous varix      MACRO:  None      Signed by: Nirmala Erazo 5/29/2024 2:47 PM  Dictation workstation:   [id number]         ASSESSMENT:   Gregory Tai is a 89 y.o. 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     We went with the patient and his POA over his current presentation. 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    We then explained the natural history of schwannoma and meningiomas, with both lesions being mostly benign slow-growing tumors. We then went over the patient neurological function that has been somewhat declining since October of last year. We stated that multiple etiologies can be contributing to this symptomatology such new onset vascular dementia, seizure, and also possibly mass effect mainly deriving from the vestibular schwannoma and the left falx extra-axial lesion that can be contributing to the imbalance and confusion.    We then explained the possible treatment options in general for Schwannoma and meningiomas, including continued observation, surgery and radiation.     Given the interval growth in the lesion and the patient medical morbidities, Gamma Knife radiosurgery (GKRS) can be considered. The goal of radiation would be to prevent further growth of the lesion and thereby prevent associated neurological symptoms from further growth of the lesion.  GKRS can  provide the advantage of targeting the 3 lesions in a single session, and might be more practical for the patient and his POA who are concerned about transportation.    We then spoke about the general goals of GKRS. The goal of radiation would be to prevent further growth of the lesion and thereby prevent associated neurological symptoms from further growth of the lesion. The risks of Gamma Knife radiosurgery were discussed, including short-term side effects such as headache following stereotactic frame removal, bleeding or infection at the sites of pin insertion, a few days of periorbital swelling. Long-term risks were also discussed, including a low risk of injury to the facial nerve (<1.5%) and to the trigeminal nerve (<6%).     The patient and POA understood the benefits and side effects of GKRS as outlined above. They asked questions that revealed understanding and their questions were thoroughly answered. Patient signed the consent form for GKRS but wanted to speak with family and consider other options.     Meanwhile, I have presented the case in the CNS tumor board on 08/21/2024. The images were thoroughly reviewed and discussed with my colleagues CNS radiation oncologists, neurosurgery, and radiology. Decision was to proceed with watchful observation, repeat MRI in 6 months and clinical follow-up.    I then called Luisa the POA and explained the tumor board recommendations for her. Luisa was agreeable with the plan and we will proceed with scheduling.    PLAN:       [ ] Radiation consent signed today by patient, however we will keep on observing for the next 6 months and we will defer radiation treatment.  [ ] Schedule repeat MRI in 6 months.  [ ] Follow-up in Rad Onc Clinic in 6 months.  [ ] Patient to see Dr. Downey as scheduled    NCCN Guidelines were applicable to guide this patients treatment plan.   Malini Low MD

## 2024-08-15 ASSESSMENT — ENCOUNTER SYMPTOMS
VISUAL CHANGE: 1
FOCAL WEAKNESS: 1
CLUMSINESS: 1
LOSS OF BALANCE: 1
CONFUSION: 1
NEUROLOGIC COMPLAINT: 1
ALTERED MENTAL STATUS: 1
WEAKNESS: 1
MEMORY LOSS: 1

## 2024-08-16 DIAGNOSIS — D32.9 MENINGIOMA (MULTI): Primary | ICD-10-CM

## 2024-08-16 DIAGNOSIS — D33.3 CPA (CEREBELLOPONTINE ANGLE) TUMOR (MULTI): ICD-10-CM

## 2024-08-19 NOTE — PROGRESS NOTES
History of present illness:  Gregory Tai is a 89 y.o. male presenting today on referral from Dr. Jelani Hawk for evaluation and management of a left CPA lesion. His power of  and friend, Luisa Holbrook, is present with him.    He has a complex medical history of coronary artery disease s/p PCI in 2004 in setting of MI, hypertension, hyperlipidemia, carotid arthrosclerosis, chronic heart failure. He also had COPD, chronic aspiration, and history of tobacco use.     His history is significant for longstanding hearing loss along with his known cervical spine extra-axial cell lesion for which she underwent resection decompression back in 2013 by Dr. Saini.  Recent imaging showed 2 extra-axial masses consistent with meningioma in the left IAC CPA lesion.  He was seen by Dr. Reddy and Dr. Tabares who presented him and their tumor board meeting and agreed on observation with a repeat MRI at 6 months.    He currently lives in an assisted living facility.  He does have right-sided hemiparesis and he drags his feet on that side and has had falls in the past.  But he denies any vertiginous dizziness.  He has a family history of hearing loss. He was in the [employer].     The patient's current medications, active allergies and list of medical problems were reviewed in the EHR and confirmed electronically there are as follows;    Allergies:   Allergies   Allergen Reactions    Adhesive Tape-Silicones Unknown    Amoxicillin Diarrhea    Amoxicillin-Pot Clavulanate Unknown    Azelastine Unknown    Beconase Aq [Beclomethasone Diprop (Aq)] Other     Nose bleed     Clopidogrel Bisulfate Unknown    Cromolyn Swelling and Other     NASALCROM - NOSE BLEEDS    Cromolyn Sodium Other    Fluticasone Propionate Unknown    Guaifenesin Unknown    Levofloxacin In D5w Unknown    Nasalcrom A Swelling     (nose)    Oxcarbazepine Unknown    Oxycodone-Acetaminophen Swelling and Other     Anxiety, unable to sleep, agitation      Paxil [Paroxetine Hcl] Other     Agitation, Jittery     Phenylephrine-Guaifenesin Swelling and Other    Phenylpropanolamine Hcl Unknown    Pseudoephedrine Other     ENTEX LA - JITTERY; Agitation      Sulfabenzamide Unknown    Tolterodine Swelling     swelling of hands    Venlafaxine Swelling and Other     Behavior changes     Ace Inhibitors Rash     Dry cough    Corticosteroids (Glucocorticoids) GI bleeding, Rash, Swelling and Other     swelling - BECONASE    Heavy nose bleed    Epinephrine Rash, Swelling and Other     Tachycardia    Karaya Gum Rash    Levofloxacin Palpitations, Rash, Other and Dizziness     Irritability     Olopatadine Rash, Swelling and Other     Serious sinus infection    Oxybutynin Chloride Rash and Other    Paroxetine Swelling, Palpitations and Other    Phenylpropanolamine Nausea And Vomiting    Sulfamethoxazole-Trimethoprim Swelling, Rash, Other and Headache     Severe headache, neck pain, aseptic meningitis     Problem list:  Patient Active Problem List   Diagnosis    Anemia    Hypertension    PAD (peripheral artery disease) (CMS-HCC)    Benign prostatic hyperplasia    Bilateral carotid artery stenosis    Bilateral occipital neuralgia    Obstructive sleep apnea syndrome    Cervical myelopathy (Multi)    Chronic CHF (Multi)    Chronic obstructive pulmonary disease, group C, by Global Initiative for Chronic Obstructive Lung Disease 2013 classification (Multi)    CKD (chronic kidney disease) stage 3, GFR 30-59 ml/min (Multi)    Controlled diabetes mellitus with circulatory complication (Multi)    Atherosclerosis of native coronary artery of native heart with angina pectoris (CMS-HCC)    Mixed simple and mucopurulent chronic bronchitis (Multi)    Nonrheumatic aortic valve stenosis    Reactive airway disease ([facility])    Recurrent major depressive disorder (CMS-HCC)    Keratitis sicca, bilateral (Multi)    Actinic keratosis    Angioma    Heart palpitations    Gallbladder stone with nonacute  cholecystitis    Esophageal web ([facility])    Esophageal reflux    Esophageal dysphagia    Epiretinal membrane (ERM) of right eye    Elevated serum creatinine    Edema of extremities    Ear canal dryness, bilateral    Dizziness and giddiness    Disorder of prostate    Diffuse esophageal spasm    Depression    Chronic pain    Concussion with brief LOC    Cervical postlaminectomy syndrome    Chronic cough    Bilateral sensorineural hearing loss    Balanitis    Auditory discrimination impairment, bilateral    Aspiration into airway    Anxiety    AMD (age-related macular degeneration), bilateral    Trigger ring finger of left hand    Trigger middle finger of left hand    Senile purpura (CMS-HCC)    Arthropathy    Sun-damaged skin    Condition not found    Extrapyramidal disease and abnormal movement disorder    History of coronary artery stent placement    Hyperlipidemia    Hyponatremia    Impairment of balance    Left carpal tunnel syndrome    Left inguinal hernia    Lumbar stenosis    Lung nodule    Mixed incontinence    MRSA infection    Myalgia    Neuropathy    Pseudophakia of both eyes    Restless legs syndrome    Smell disorder    Spondylosis without myelopathy or radiculopathy, sacral and sacrococcygeal region    Syncope    Tinea cruris    Urgency of urination    Hemangioma, capillary    Atheroscler of native artery of right leg with intermit claudication (CMS-[facility])    Atherosclerotic peripheral vascular disease (CMS-[facility])    Bilateral dry eyes    Central sleep apnea    Cervical cord myelomalacia (Multi)    Severe persistent asthma, uncomplicated (Multi)    Other pulmonary embolism without acute cor pulmonale, unspecified chronicity (Multi)    Astigmatism of both eyes with presbyopia [H52.203, H52.4]    CPA (cerebellopontine angle) tumor (Multi)    Meningioma (Multi)     Current list of medications:   Current Outpatient Medications   Medication Sig Dispense Refill    acetaminophen (Tylenol) 325 mg tablet Take 1-2  tablets (325-650 mg) by mouth every 6 hours.      aspirin 81 mg EC tablet Take 1 tablet (81 mg) by mouth once daily.      atorvastatin (Lipitor) 80 mg tablet Take 1 tablet (80 mg) by mouth once daily at bedtime. 90 tablet 3    benzonatate (Tessalon) 200 mg capsule Take 1 capsule (200 mg) by mouth 3 times a day as needed.      cycloSPORINE (Restasis) 0.05 % ophthalmic emulsion Administer 1 drop into both eyes 2 times a day.      escitalopram (Lexapro) 10 mg tablet Take 1 tablet (10 mg) by mouth once daily. 90 tablet 3    famotidine (Pepcid) 20 mg tablet Take 1 tablet (20 mg) by mouth 2 times a day. 180 tablet 3    furosemide (Lasix) 40 mg tablet Take 1 tablet (40 mg) by mouth 2 times daily (morning and late afternoon). On Tuesday and Saturday take 2 tablets (80 mg) twice daily per Dr. Oconnell      gabapentin (Neurontin) 300 mg capsule Take 1 capsule (300 mg) by mouth 2 times a day. 180 capsule 3    ipratropium-albuteroL (Duo-Neb) 0.5-2.5 mg/3 mL nebulizer solution Take 3 mL by nebulization once daily.      isosorbide mononitrate ER (Imdur) 60 mg 24 hr tablet TAKE 1 TABLET BY MOUTH EVERY MORNING 90 tablet 3    levETIRAcetam (Keppra) 250 mg tablet Take 0.5 tablets (125 mg) by mouth 2 times a day. 90 tablet 2    metoprolol tartrate (Lopressor) 25 mg tablet TAKE 1 TABLET BY MOUTH EVERY 12 HOURS (Patient taking differently: Take 0.5 tablets (12.5 mg) by mouth every 12 hours.) 180 tablet 3    montelukast (Singulair) 10 mg tablet Take 1 tablet (10 mg) by mouth once daily.      olopatadine (Patanol) 0.1 % ophthalmic solution 1 drop 2 times a day.      pseudoephedrine-guaifenesin (Mucinex D)  mg 12 hr tablet Take by mouth every 12 hours.      Spiriva with HandiHaler 18 mcg inhalation capsule INHALE CONTENTS OF 1 CAPSULE EVERY DAY      spironolactone (Aldactone) 25 mg tablet Take 1 tablet (25 mg) by mouth once daily. 90 tablet 3     No current facility-administered medications for this visit.     Medical history:  Past  Medical History:   Diagnosis Date    Anesthesia of skin     Numbness    Chronic obstructive pulmonary disease with (acute) exacerbation (Multi) 02/19/2013    Obstructive chronic bronchitis with exacerbation    Disease of stomach and duodenum, unspecified     Stomach problems    Dizziness and giddiness     Lightheadedness    Essential (primary) hypertension 09/06/2022    Hypertension, unspecified type    Orchitis 02/19/2013    Orchitis    Other conditions influencing health status 02/19/2013    Meatal stenosis    Other pulmonary embolism without acute cor pulmonale (Multi) 06/23/2013    Pulmonary embolism    Other specified soft tissue disorders 06/23/2013    Limb swelling    Other spondylosis with myelopathy, cervical region 06/23/2013    Cervical spondylosis with myelopathy    Personal history of other diseases of male genital organs     History of prostate disorder    Personal history of other diseases of the circulatory system 08/26/2020    History of peripheral vascular disease    Personal history of other diseases of the musculoskeletal system and connective tissue     History of arthritis    Personal history of other diseases of the nervous system and sense organs     History of macular degeneration    Personal history of other specified conditions     History of urinary incontinence    Urinary tract infection, site not specified 02/19/2013    Urinary tract infection     Surgical history:  Past Surgical History:   Procedure Laterality Date    CATARACT EXTRACTION  11/27/2017    Cataract Surgery    CHOLECYSTECTOMY  11/27/2017    Cholecystectomy    COLONOSCOPY  02/06/2014    Colonoscopy (Fiberoptic)    CT ANGIO NECK  3/12/2013    CT NECK ANGIO W AND WO IV CONTRAST 3/12/2013 [facility] [place] [facility] INPATIENT LEGAC[facility]    CT AORTA AND BILATERAL ILIOFEMORAL RUNOFF ANGIOGRAM W AND/OR WO IV CONTRAST  6/21/2022    CT AORTA AND BILATERAL ILIOFEMORAL RUNOFF ANGIOGRAM W AND/OR WO IV CONTRAST 6/21/2022 [facility]    CT AORTA AND  BILATERAL ILIOFEMORAL RUNOFF ANGIOGRAM W AND/OR WO IV CONTRAST  7/10/2023    CT AORTA AND BILATERAL ILIOFEMORAL RUNOFF ANGIOGRAM W AND/OR WO IV CONTRAST 7/10/2023 [facility]    OTHER SURGICAL HISTORY  07/22/2013    Arthrodesis Cervical    OTHER SURGICAL HISTORY  12/10/2013    Surgery    OTHER SURGICAL HISTORY  07/09/2019    Cardiac catheterization with stent placement    OTHER SURGICAL HISTORY  10/02/2020    Blepharoplasty    TONSILLECTOMY  02/03/2014    Tonsillectomy With Adenoidectomy     Family history:  Family History   Problem Relation Name Age of Onset    Dementia Mother      Glaucoma Mother      Hypertension Mother      Blood clot Mother          in the brain    Cancer Father      Prostate cancer Father      Glaucoma Sister      Hypertension Sister      Cancer Brother       Social history:  Social History     Tobacco Use    Smoking status: Former     Types: Cigarettes    Smokeless tobacco: Never   Vaping Use    Vaping status: Never Used   Substance Use Topics    Alcohol use: Not Currently    Drug use: Never     Physical Examination:  CONSTITUTIONAL:  No acute distress  VOICE:  No hoarseness or other abnormality  RESPIRATION:  Breathing comfortably, no stridor  CV:  No clubbing/cyanosis/edema in hands  EYES:  EOM intact, sclera clear  NEURO:  Alert and oriented times 3, Cranial nerves II-XII grossly intact and symmetric bilaterally  HEAD AND FACE:  Symmetric facial features, no masses or lesions  RIGHT EAR:  Normal external ear and post auricular area, no visible lesions, external auditory canal patent, tympanic membrane intact, no retraction, no signs of mass, effusion, or infection within the middle ear  LEFT EAR: Normal external ear and post auricular area, no visible lesions, external auditory canal patent, tympanic membrane intact, no retraction, no signs of mass, effusion, or infection within the middle ear  NOSE:  Anterior rhinoscopy clear, no significant external deformity.  ORAL CAVITY/OROPHARYNX/LIPS:   normal lining, mobile tongue.  PHARYNGEAL WALLS: Moist mucosal lining, good palatal elevation  NECK/LYMPH:  No LAD, no thyroid masses, trachea midline  SKIN:  Neck and facial skin is without scar or injury  PSYCH:  Alert and oriented with appropriate mood and affect    Diagnostic testing:    No audiogram was available to review.    MRI findings reveal:   Enhancing lesion of the left CPA measurements are 9 x 18 mm, consistent with vestibular schwannoma.  No evidence of brainstem compression    I personally reviewed the available patient's external record and independently reviewed their audiometric testing [and] radiographic imaging through the appropriate viewing software as detailed in my note and agree with the detailed report.     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  Asymmetric hearing loss  History of skull-base lesion, C1 and C2 lesion s/p resection  Preaxial masses consistent with managing    Recommendation:  As far as the schwannoma, we discussed treatment options. We discussed the tumor radiation board recommendation of repeating the MRI in 6 months. The other lesions are currently managed by his neurologist and I agree with the current treatment. I recommended continuing hearing amplification on the right side. I brought up the option of having his hearing also managed by the audiology department at the [facility] as they have an excellent audiology department.    I discussed with the patient the complexity of my medical decision making including the treatment and testing rational, indications of their elective procedure and possible adverse effects and/or complications. Based on the provided documentation and my professional assessment of this patient's multiple new diagnosis, the complexity of evaluation and treatment is moderate.    This note was created using speech recognition transcription software. Despite proofreading, several typographical errors might be present that might  affect the meaning of the content. Please call with any questions.    Scribe Attestation  By signing my name below, I, Shante De La Torre , Gopi   attest that this documentation has been prepared under the direction and in the presence of Sherrie Downey MD.     Shante De La Torre

## 2024-08-21 ENCOUNTER — TUMOR BOARD CONFERENCE (OUTPATIENT)
Dept: HEMATOLOGY/ONCOLOGY | Facility: HOSPITAL | Age: 89
End: 2024-08-21
Payer: MEDICARE

## 2024-08-21 DIAGNOSIS — D32.9 MENINGIOMA (MULTI): Primary | ICD-10-CM

## 2024-08-21 DIAGNOSIS — D36.10 SCHWANNOMA: ICD-10-CM

## 2024-08-21 NOTE — ADDENDUM NOTE
Encounter addended by: Malini Low MD on: 8/21/2024 12:26 PM   Actions taken: Level of Service modified, Clinical Note Signed

## 2024-08-21 NOTE — TUMOR BOARD NOTE
CNS Tumor Board Recommendations       Patient was presented by Dr. Malini Low at our CNS Tumor Board on 08/21/24 which included representatives from Radiation oncology, Surgical oncology, Neuro-oncology, Pathology, Radiology, Research, Neurosurgery, Social Work (Neurosurgery).     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 P/w few episodes of loss of control over his limbs, not able to use utensils, and brushing his teeth. EEG negative. MRI with postoperative changes from posterior arch of C1 with interval enlargement of extra-axial dural based lesion of L falx now measuring 31mm x 13mm x 28 mm where it previously measured 13mm x 4mm x 16mm in 2016 with mass effect. There was an additional extra-axial dural based lesion along the L parietal bone 13 x 6mm, nodular lesion within the L CPA measuring 11 x 10mm.     Following with radiation oncology for GKRS evaluation but unable to complete dotate PET due to insurance.     The CNS Tumor Board tumor board considered available treatment options and made the following recommendations: 6 month follow up imaging with MRI.     Clinical Trial Status: N/A     National site-specific guidelines were discussed with respect to the case.

## 2024-08-22 ENCOUNTER — APPOINTMENT (OUTPATIENT)
Dept: OTOLARYNGOLOGY | Facility: CLINIC | Age: 89
End: 2024-08-22
Payer: MEDICARE

## 2024-08-22 VITALS — WEIGHT: 197 LBS | HEIGHT: 67 IN | BODY MASS INDEX: 30.92 KG/M2 | TEMPERATURE: 98 F

## 2024-08-22 DIAGNOSIS — D33.3 VESTIBULAR SCHWANNOMA (MULTI): ICD-10-CM

## 2024-08-22 DIAGNOSIS — H90.3 ASYMMETRIC SENSORINEURAL DEAFNESS: Primary | ICD-10-CM

## 2024-08-22 PROCEDURE — 1036F TOBACCO NON-USER: CPT | Performed by: OTOLARYNGOLOGY

## 2024-08-22 PROCEDURE — 1157F ADVNC CARE PLAN IN RCRD: CPT | Performed by: OTOLARYNGOLOGY

## 2024-08-22 PROCEDURE — 1160F RVW MEDS BY RX/DR IN RCRD: CPT | Performed by: OTOLARYNGOLOGY

## 2024-08-22 PROCEDURE — 99204 OFFICE O/P NEW MOD 45 MIN: CPT | Performed by: OTOLARYNGOLOGY

## 2024-08-22 PROCEDURE — 1159F MED LIST DOCD IN RCRD: CPT | Performed by: OTOLARYNGOLOGY

## 2024-08-22 ASSESSMENT — PATIENT HEALTH QUESTIONNAIRE - PHQ9
1. LITTLE INTEREST OR PLEASURE IN DOING THINGS: NOT AT ALL
2. FEELING DOWN, DEPRESSED OR HOPELESS: NOT AT ALL
SUM OF ALL RESPONSES TO PHQ9 QUESTIONS 1 AND 2: 0

## 2024-09-11 ENCOUNTER — LAB (OUTPATIENT)
Dept: LAB | Facility: LAB | Age: 89
End: 2024-09-11
Payer: MEDICARE

## 2024-09-11 DIAGNOSIS — N40.1 BENIGN PROSTATIC HYPERPLASIA WITH LOWER URINARY TRACT SYMPTOMS: ICD-10-CM

## 2024-09-11 DIAGNOSIS — I25.10 ATHEROSCLEROTIC HEART DISEASE OF NATIVE CORONARY ARTERY WITHOUT ANGINA PECTORIS: Primary | ICD-10-CM

## 2024-09-11 DIAGNOSIS — N18.30 CHRONIC KIDNEY DISEASE, STAGE 3 UNSPECIFIED (MULTI): ICD-10-CM

## 2024-09-11 LAB
APPEARANCE UR: CLEAR
BACTERIA #/AREA URNS AUTO: ABNORMAL /HPF
BILIRUB UR STRIP.AUTO-MCNC: NEGATIVE MG/DL
COLOR UR: ABNORMAL
CREAT UR-MCNC: 54.2 MG/DL (ref 20–370)
ERYTHROCYTE [DISTWIDTH] IN BLOOD BY AUTOMATED COUNT: 13 % (ref 11.5–14.5)
GLUCOSE UR STRIP.AUTO-MCNC: NORMAL MG/DL
HCT VFR BLD AUTO: 37.8 % (ref 41–52)
HGB BLD-MCNC: 12 G/DL (ref 13.5–17.5)
HYALINE CASTS #/AREA URNS AUTO: ABNORMAL /LPF
KETONES UR STRIP.AUTO-MCNC: NEGATIVE MG/DL
LEUKOCYTE ESTERASE UR QL STRIP.AUTO: ABNORMAL
MCH RBC QN AUTO: 30.6 PG (ref 26–34)
MCHC RBC AUTO-ENTMCNC: 31.7 G/DL (ref 32–36)
MCV RBC AUTO: 96 FL (ref 80–100)
MICROALBUMIN UR-MCNC: <7 MG/L
MICROALBUMIN/CREAT UR: NORMAL MG/G{CREAT}
MUCOUS THREADS #/AREA URNS AUTO: ABNORMAL /LPF
NITRITE UR QL STRIP.AUTO: ABNORMAL
NRBC BLD-RTO: 0 /100 WBCS (ref 0–0)
PH UR STRIP.AUTO: 6 [PH]
PLATELET # BLD AUTO: 162 X10*3/UL (ref 150–450)
PROT UR STRIP.AUTO-MCNC: NEGATIVE MG/DL
RBC # BLD AUTO: 3.92 X10*6/UL (ref 4.5–5.9)
RBC # UR STRIP.AUTO: NEGATIVE /UL
RBC #/AREA URNS AUTO: ABNORMAL /HPF
SP GR UR STRIP.AUTO: 1.01
UROBILINOGEN UR STRIP.AUTO-MCNC: NORMAL MG/DL
WBC # BLD AUTO: 9.5 X10*3/UL (ref 4.4–11.3)
WBC #/AREA URNS AUTO: ABNORMAL /HPF

## 2024-09-11 PROCEDURE — 80069 RENAL FUNCTION PANEL: CPT

## 2024-09-11 PROCEDURE — 36415 COLL VENOUS BLD VENIPUNCTURE: CPT

## 2024-09-11 PROCEDURE — 82043 UR ALBUMIN QUANTITATIVE: CPT

## 2024-09-11 PROCEDURE — 85027 COMPLETE CBC AUTOMATED: CPT

## 2024-09-11 PROCEDURE — 81001 URINALYSIS AUTO W/SCOPE: CPT

## 2024-09-11 PROCEDURE — 80061 LIPID PANEL: CPT

## 2024-09-11 PROCEDURE — 82570 ASSAY OF URINE CREATININE: CPT

## 2024-09-12 LAB
ALBUMIN SERPL BCP-MCNC: 3.8 G/DL (ref 3.4–5)
ANION GAP SERPL CALC-SCNC: 13 MMOL/L (ref 10–20)
BUN SERPL-MCNC: 36 MG/DL (ref 6–23)
CALCIUM SERPL-MCNC: 9.1 MG/DL (ref 8.6–10.6)
CHLORIDE SERPL-SCNC: 104 MMOL/L (ref 98–107)
CO2 SERPL-SCNC: 31 MMOL/L (ref 21–32)
CREAT SERPL-MCNC: 1.32 MG/DL (ref 0.5–1.3)
EGFRCR SERPLBLD CKD-EPI 2021: 52 ML/MIN/1.73M*2
GLUCOSE SERPL-MCNC: 99 MG/DL (ref 74–99)
PHOSPHATE SERPL-MCNC: 3.9 MG/DL (ref 2.5–4.9)
POTASSIUM SERPL-SCNC: 4.1 MMOL/L (ref 3.5–5.3)
SODIUM SERPL-SCNC: 144 MMOL/L (ref 136–145)

## 2024-09-16 ENCOUNTER — APPOINTMENT (OUTPATIENT)
Dept: NEUROSURGERY | Facility: HOSPITAL | Age: 89
End: 2024-09-16
Payer: MEDICARE

## 2024-09-17 ENCOUNTER — HOSPITAL ENCOUNTER (OUTPATIENT)
Dept: CARDIOLOGY | Facility: HOSPITAL | Age: 89
Discharge: HOME | End: 2024-09-17
Payer: MEDICARE

## 2024-09-17 ENCOUNTER — TELEPHONE (OUTPATIENT)
Dept: CARDIOLOGY | Facility: HOSPITAL | Age: 89
End: 2024-09-17

## 2024-09-17 ENCOUNTER — OFFICE VISIT (OUTPATIENT)
Dept: CARDIOLOGY | Facility: HOSPITAL | Age: 89
End: 2024-09-17
Payer: MEDICARE

## 2024-09-17 ENCOUNTER — APPOINTMENT (OUTPATIENT)
Dept: CARDIOLOGY | Facility: HOSPITAL | Age: 89
End: 2024-09-17
Payer: MEDICARE

## 2024-09-17 VITALS
OXYGEN SATURATION: 94 % | WEIGHT: 191.2 LBS | HEART RATE: 82 BPM | BODY MASS INDEX: 28.98 KG/M2 | SYSTOLIC BLOOD PRESSURE: 91 MMHG | DIASTOLIC BLOOD PRESSURE: 52 MMHG | HEIGHT: 68 IN

## 2024-09-17 DIAGNOSIS — E78.5 HYPERLIPIDEMIA, UNSPECIFIED HYPERLIPIDEMIA TYPE: ICD-10-CM

## 2024-09-17 DIAGNOSIS — I50.32 CHRONIC DIASTOLIC CONGESTIVE HEART FAILURE: ICD-10-CM

## 2024-09-17 DIAGNOSIS — I35.0 NONRHEUMATIC AORTIC (VALVE) STENOSIS: ICD-10-CM

## 2024-09-17 DIAGNOSIS — I25.10 ATHEROSCLEROSIS OF NATIVE CORONARY ARTERY OF NATIVE HEART WITHOUT ANGINA PECTORIS: ICD-10-CM

## 2024-09-17 DIAGNOSIS — I25.119 ATHEROSCLEROSIS OF NATIVE CORONARY ARTERY OF NATIVE HEART WITH ANGINA PECTORIS: Primary | ICD-10-CM

## 2024-09-17 DIAGNOSIS — I10 HYPERTENSION, UNSPECIFIED TYPE: ICD-10-CM

## 2024-09-17 DIAGNOSIS — I65.23 BILATERAL CAROTID ARTERY STENOSIS: ICD-10-CM

## 2024-09-17 LAB
CHOLEST SERPL-MCNC: 110 MG/DL (ref 0–199)
CHOLESTEROL/HDL RATIO: 2.4
HDLC SERPL-MCNC: 46.1 MG/DL
LDLC SERPL CALC-MCNC: 47 MG/DL
NON HDL CHOLESTEROL: 64 MG/DL (ref 0–149)
TRIGL SERPL-MCNC: 84 MG/DL (ref 0–149)
VLDL: 17 MG/DL (ref 0–40)

## 2024-09-17 PROCEDURE — 1160F RVW MEDS BY RX/DR IN RCRD: CPT | Performed by: INTERNAL MEDICINE

## 2024-09-17 PROCEDURE — G2211 COMPLEX E/M VISIT ADD ON: HCPCS | Performed by: INTERNAL MEDICINE

## 2024-09-17 PROCEDURE — 3074F SYST BP LT 130 MM HG: CPT | Performed by: INTERNAL MEDICINE

## 2024-09-17 PROCEDURE — 1157F ADVNC CARE PLAN IN RCRD: CPT | Performed by: INTERNAL MEDICINE

## 2024-09-17 PROCEDURE — 93306 TTE W/DOPPLER COMPLETE: CPT

## 2024-09-17 PROCEDURE — 93306 TTE W/DOPPLER COMPLETE: CPT | Performed by: INTERNAL MEDICINE

## 2024-09-17 PROCEDURE — 1159F MED LIST DOCD IN RCRD: CPT | Performed by: INTERNAL MEDICINE

## 2024-09-17 PROCEDURE — 99214 OFFICE O/P EST MOD 30 MIN: CPT | Performed by: INTERNAL MEDICINE

## 2024-09-17 PROCEDURE — 3078F DIAST BP <80 MM HG: CPT | Performed by: INTERNAL MEDICINE

## 2024-09-17 PROCEDURE — 1036F TOBACCO NON-USER: CPT | Performed by: INTERNAL MEDICINE

## 2024-09-17 PROCEDURE — 99214 OFFICE O/P EST MOD 30 MIN: CPT | Mod: 25 | Performed by: INTERNAL MEDICINE

## 2024-09-17 RX ORDER — TIOTROPIUM BROMIDE AND OLODATEROL 3.124; 2.736 UG/1; UG/1
2 SPRAY, METERED RESPIRATORY (INHALATION) DAILY
COMMUNITY
Start: 2024-09-01

## 2024-09-17 ASSESSMENT — COLUMBIA-SUICIDE SEVERITY RATING SCALE - C-SSRS
2. HAVE YOU ACTUALLY HAD ANY THOUGHTS OF KILLING YOURSELF?: NO
6. HAVE YOU EVER DONE ANYTHING, STARTED TO DO ANYTHING, OR PREPARED TO DO ANYTHING TO END YOUR LIFE?: NO
1. IN THE PAST MONTH, HAVE YOU WISHED YOU WERE DEAD OR WISHED YOU COULD GO TO SLEEP AND NOT WAKE UP?: NO

## 2024-09-17 ASSESSMENT — ENCOUNTER SYMPTOMS
DEPRESSION: 0
OCCASIONAL FEELINGS OF UNSTEADINESS: 1
LOSS OF SENSATION IN FEET: 1

## 2024-09-17 NOTE — PATIENT INSTRUCTIONS
Stop isosorbide.  I added a lipid profile to your labs from last week.  You do not need to get your blood redrawn.  Follow-up in 6 months.   
sent home w/ family/friend

## 2024-09-17 NOTE — PROGRESS NOTES
Primary Care Physician: Suellen Waite MD  Date of Visit: 09/17/2024 11:20 AM EDT  Location of visit: [facility]     Chief Complaint:   Chief Complaint   Patient presents with    Follow-up        HPI / Summary:   Gregory Tai is a 89 y.o. male presents for followup.  He has no new complaints.  He has been evaluated by neurosurgery for multiple intracranial lesions.  He has no change in his chronic dyspnea on exertion when walking prolonged distances.  This is associated with mild chest tightness.  This has not changed in many years.  He is not certain if he notices any difference with isosorbide.  He has been taking furosemide 40 mg twice a day.  He notes occasional lower extremity edema.  He has occasional orthostatic lightheadedness without near syncope or syncope.  The patient denies palpitations,  syncope, orthopnea, paroxysmal nocturnal dyspnea, or bleeding problems.          Past Medical History:   Diagnosis Date    Anesthesia of skin     Numbness    Chronic obstructive pulmonary disease with (acute) exacerbation (Multi) 02/19/2013    Obstructive chronic bronchitis with exacerbation    Disease of stomach and duodenum, unspecified     Stomach problems    Dizziness and giddiness     Lightheadedness    Essential (primary) hypertension 09/06/2022    Hypertension, unspecified type    Orchitis 02/19/2013    Orchitis    Other conditions influencing health status 02/19/2013    Meatal stenosis    Other pulmonary embolism without acute cor pulmonale (Multi) 06/23/2013    Pulmonary embolism    Other specified soft tissue disorders 06/23/2013    Limb swelling    Other spondylosis with myelopathy, cervical region 06/23/2013    Cervical spondylosis with myelopathy    Personal history of other diseases of male genital organs     History of prostate disorder    Personal history of other diseases of the circulatory system 08/26/2020    History of peripheral vascular disease    Personal history of other diseases of  the musculoskeletal system and connective tissue     History of arthritis    Personal history of other diseases of the nervous system and sense organs     History of macular degeneration    Personal history of other specified conditions     History of urinary incontinence    Urinary tract infection, site not specified 02/19/2013    Urinary tract infection        Past Surgical History:   Procedure Laterality Date    CATARACT EXTRACTION  11/27/2017    Cataract Surgery    CHOLECYSTECTOMY  11/27/2017    Cholecystectomy    COLONOSCOPY  02/06/2014    Colonoscopy (Fiberoptic)    CT ANGIO NECK  3/12/2013    CT NECK ANGIO W AND WO IV CONTRAST 3[place]12[place] [facility] [place] [facility]    CT AORTA AND BILATERAL ILIOFEMORAL RUNOFF ANGIOGRAM W AND/OR WO IV CONTRAST  6/21/2022    CT AORTA AND BILATERAL ILIOFEMORAL RUNOFF ANGIOGRAM W AND/OR WO IV CONTRAST 6/21/2022 [facility]    CT AORTA AND BILATERAL ILIOFEMORAL RUNOFF ANGIOGRAM W AND/OR WO IV CONTRAST  7/10/2023    CT AORTA AND BILATERAL ILIOFEMORAL RUNOFF ANGIOGRAM W AND/OR WO IV CONTRAST 7/10/2023 [facility] CT    OTHER SURGICAL HISTORY  07/22/2013    Arthrodesis Cervical    OTHER SURGICAL HISTORY  12/10/2013    Surgery    OTHER SURGICAL HISTORY  07/09/2019    Cardiac catheterization with stent placement    OTHER SURGICAL HISTORY  10/02/2020    Blepharoplasty    TONSILLECTOMY  02/03/2014    Tonsillectomy With Adenoidectomy          Social History:   reports that he has quit smoking. His smoking use included cigarettes. He has never used smokeless tobacco. He reports that he does not currently use alcohol. He reports that he does not use drugs.     Family History:  family history includes Blood clot in his mother; Cancer in his brother and father; Dementia in his mother; Glaucoma in his mother and sister; Hypertension in his mother and sister; Prostate cancer in his father.      Allergies:  Allergies   Allergen Reactions    Adhesive Tape-Silicones Unknown    Amoxicillin Diarrhea     Amoxicillin-Pot Clavulanate Unknown    Azelastine Unknown    Beconase Aq [Beclomethasone Diprop (Aq)] Other     Nose bleed     Clopidogrel Bisulfate Unknown    Cromolyn Swelling and Other     NASALCROM - NOSE BLEEDS    Cromolyn Sodium Other    Fluticasone Propionate Unknown    Guaifenesin Unknown    Levofloxacin In D5w Unknown    Nasalcrom A Swelling     (nose)    Oxcarbazepine Unknown    Oxycodone-Acetaminophen Swelling and Other     Anxiety, unable to sleep, agitation     Paxil [Paroxetine Hcl] Other     Agitation, Jittery     Phenylephrine-Guaifenesin Swelling and Other    Phenylpropanolamine Hcl Unknown    Pseudoephedrine Other     ENTEX LA - JITTERY; Agitation      Sulfabenzamide Unknown    Tolterodine Swelling     swelling of hands    Venlafaxine Swelling and Other     Behavior changes     Ace Inhibitors Rash     Dry cough    Corticosteroids (Glucocorticoids) GI bleeding, Rash, Swelling and Other     swelling - BECONASE    Heavy nose bleed    Epinephrine Rash, Swelling and Other     Tachycardia    Karaya Gum Rash    Levofloxacin Palpitations, Rash, Other and Dizziness     Irritability     Olopatadine Rash, Swelling and Other     Serious sinus infection    Oxybutynin Chloride Rash and Other    Paroxetine Swelling, Palpitations and Other    Phenylpropanolamine Nausea And Vomiting    Sulfamethoxazole-Trimethoprim Swelling, Rash, Other and Headache     Severe headache, neck pain, aseptic meningitis       Outpatient Medications:  Current Outpatient Medications   Medication Instructions    acetaminophen (Tylenol) 325 mg tablet 1-2 tablets, oral, Every 6 hours    aspirin 81 mg EC tablet 1 tablet, oral, Daily    atorvastatin (LIPITOR) 80 mg, oral, Nightly    benzonatate (TESSALON) 200 mg, oral, 3 times daily PRN    cycloSPORINE (Restasis) 0.05 % ophthalmic emulsion 1 drop, Both Eyes, 2 times daily    escitalopram (LEXAPRO) 10 mg, oral, Daily    famotidine (PEPCID) 20 mg, oral, 2 times daily    furosemide (LASIX) 40  "mg, oral, 2 times daily (morning and late afternoon), On Tuesday and Saturday take 2 tablets (80 mg) twice daily per Dr. Oconnell    gabapentin (NEURONTIN) 300 mg, oral, 2 times daily    ipratropium-albuteroL (Duo-Neb) 0.5-2.5 mg/3 mL nebulizer solution 3 mL, nebulization, Daily    isosorbide mononitrate ER (IMDUR) 60 mg, oral, Daily before breakfast    levETIRAcetam (KEPPRA) 125 mg, oral, 2 times daily    metoprolol tartrate (LOPRESSOR) 25 mg, oral, Every 12 hours    montelukast (SINGULAIR) 10 mg, oral, Daily    olopatadine (Patanol) 0.1 % ophthalmic solution 1 drop, 2 times daily    pseudoephedrine-guaifenesin (Mucinex D)  mg 12 hr tablet oral, Every 12 hours    Spiriva with HandiHaler 18 mcg inhalation capsule INHALE CONTENTS OF 1 CAPSULE EVERY DAY    spironolactone (ALDACTONE) 25 mg, oral, Daily    Stiolto Respimat 2.5-2.5 mcg/actuation mist inhaler 2 puffs, inhalation, Daily       Physical Exam:  Vitals:    09/17/24 1113   BP: 91/52   BP Location: Left arm   Patient Position: Sitting   BP Cuff Size: Adult   Pulse: 82   SpO2: 94%   Weight: 86.7 kg (191 lb 3.2 oz)   Height: 1.715 m (5' 7.5\")     Wt Readings from Last 5 Encounters:   09/17/24 86.7 kg (191 lb 3.2 oz)   08/22/24 89.4 kg (197 lb)   08/14/24 87.3 kg (192 lb 8 oz)   07/11/24 84.8 kg (187 lb)   06/06/24 85.2 kg (187 lb 12.8 oz)     Body mass index is 29.5 kg/m².   General: Well-developed well-nourished in no acute distress  HEENT: Normocephalic atraumatic  Neck: Supple, JVP is normal negative hepatojugular reflux 2+ carotid pulses without bruit  Pulmonary: Normal respiratory effort, clear to auscultation  Cardiovascular: No right ventricular heave, normal S1 and S2, 2 out of 6 early peaking systolic ejection murmur no rubs or gallops  Abdomen: Soft nontender nondistended  Extremities: Warm without edema 2+ radial pulses bilaterally   Neurologic: Alert   Psychiatric: Normal mood and affect     Last Labs:  CMP:  Recent Labs     09/11/24  1607 " 05/07/24  1435 04/02/24  1209    140 141   K 4.1 4.2 4.3    101 101   CO2 31 33* 32   ANIONGAP 13 10 12   BUN 36* 31* 29*   CREATININE 1.32* 1.17 1.29   EGFR 52* 60* 54*   GLUCOSE 99 102* 116*     Recent Labs     09/11/24  1607 05/07/24  1435 01/15/24  1447 09/27/23  1200 08/31/22  1426 05/13/22  1108 04/13/22  1334 03/15/22  1115 03/18/21  1316 02/16/21  1342 02/02/21  1323   ALBUMIN 3.8 3.6 3.6  --    < >  --    < > 3.7   < > 3.7 3.7  3.7   ALKPHOS  --   --   --   --   --   --   --  38  --  50 47   ALT  --   --   --  32  --  23  --  21  --  19 19   AST  --   --   --   --   --   --   --  22  --  17 18   BILITOT  --   --   --   --   --   --   --  0.8  --  0.5 0.6    < > = values in this interval not displayed.     CBC:  Recent Labs     09/11/24  1607 02/15/24  0802 11/20/23  1059   WBC 9.5 6.0 6.0   HGB 12.0* 12.7* 12.3*   HCT 37.8* 40.5* 38.7*    161 175   MCV 96 96 96     COAG:   Recent Labs     11/06/19  1645 06/10/19  1345   INR 1.0 1.0     HEME/ENDO:  Recent Labs     05/07/24  1435 02/15/24  0802 11/20/23  1059 09/27/23  1200 01/18/23  1328 11/14/22  1459 09/16/21  1340 03/18/21  1130 01/21/20  1210 12/20/19  1521 05/02/18  0839 01/04/18  1405   FERRITIN  --   --   --   --   --  34  --   --  30  --   --  33   IRONSAT  --   --   --   --   --  33  --   --  32 31   < > 23*   TSH  --   --  1.89  --   --  1.11  --  2.02  --   --    < >  --    HGBA1C 6.6* 6.8*  --  6.6*   < >  --    < >  --   --   --    < >  --     < > = values in this interval not displayed.      CARDIAC:   Recent Labs     11/06/19  1645   *     Recent Labs     09/27/23  1200 05/13/22  1108 03/15/22  1115   CHOL 106 102 113   LDLF 46 46 53   HDL 44.8 46.1 46.2   TRIG 78 50 67       Last Cardiology Tests:  ECG:  An electrocardiogram performed today that I reviewed shows normal sinus rhythm nonspecific ST abnormality.    Event monitor December 2023  Brief SVT longest lasting 11 beats    Echo:  Echocardiogram September 12,  2023  CONCLUSIONS:  1. Left ventricular systolic function is normal with a 65-70% estimated ejection fraction.  2. Spectral Doppler shows an impaired relaxation pattern of left ventricular diastolic filling.  3. The left atrium is moderately dilated.  4. There is moderate mitral annular calcification.  5. Aortic valve appears abnormal.  6. Mild to moderate aortic valve stenosis.  7. There is moderate aortic valve cusp calcification.    Cath:  Cardiac catheterization June 13, 2019  Coronary Lesion Summary:  Vessel            Stenosis         Vessel Segment  LAD             40% stenosis       proximal to mid  Circumflex      40% stenosis             mid  OM 1            50% stenosis           ostial  RCA        50% in-stent restenosis proximal to mid  RCA             99% stenosis             mid  CONCLUSIONS:   1. Unsuccessful PCI of the mid RCA due to inability for the balloon to cross the lesion.   2. Severe single vessel CAD in a right dominant system.   3. Elevated left and right heart filling pressures.   4. Mild-moderate pulmonary hypertension.   5. Preserved cardiac output.   6. No evidence of intracardiac shunt.   7. No aortic stenosis.   8. Patent stents in OM1 and RCA.      Stress Test:  Lexiscan nuclear stress test May 2019  IMPRESSION:  1. There is limited territory of mild stress-induced ischemia without  infarction in the basal inferior wall.  2. The remainder of the ventricle demonstrate normal myocardial  perfusion study without evidence of stress-induced ischemia or  infarct.  3. The left ventricular function is normal with LV EF of 59%  4. The left ventricle is normal in size.      Cardiac Imaging:  MRI of the brain May 2024  IMPRESSION:  1. There is an enhancing lesion within the left cerebellar pontine  angle cistern extending into the left internal auditory canal  suggestive of a vestibular schwannoma.  2. Extra-axial enhancing mass along the left interhemispheric falx  posteriorly has increased  in size compared to the prior exam  09/06/2016, and likely represents a meningioma.  3. Extra-axial enhancing lesion along the left parietal calvarium  measures 1.3 x 0.6 cm which likely represents a meningioma.  4. Tubular enhancing structure within the superior orbit on the right  may represent a venous varix    Carotid ultrasound May 2024  CONCLUSIONS:  Right Carotid: Findings are consistent with greater than 70% stenosis of the right proximal internal carotid artery. Turbulent flow seen by color Doppler. Right external carotid artery appears patent with no evidence of stenosis. The right vertebral artery is patent with antegrade flow. No evidence of hemodynamically significant stenosis in the right subclavian artery.  Left Carotid: Findings are consistent with less than 50% stenosis of the left proximal internal carotid artery. Left external carotid artery appears patent with no evidence of stenosis. The left vertebral artery is patent with antegrade flow. No evidence of hemodynamically significant stenosis in the left subclavian artery.    Lower extremity ABIs May 8, 2024  CONCLUSIONS:  Right Lower PVR: Evidence of moderate arterial occlusive disease in the right lower extremity at rest. Normal digital perfusion noted. Monophasic flow is noted in the right dorsalis pedis artery and right posterior tibial artery. Biphasic flow is noted in the right common femoral artery.  Left Lower PVR: Evidence of mild arterial occlusive disease in the left lower extremity at rest. Normal digital perfusion noted. Biphasic flow is noted in the left common femoral artery, left posterior tibial artery and left dorsalis pedis artery.    CT angio of the abdomen with bilateral iliofemoral runoff July 2023  Impression   1. Extensive atherosclerotic calcifications with high-grade stenosis  involving the right common femoral artery, similar to prior  examination. Previously noted asymmetric flow with reduced flow in  the left lower  extremity is not appreciated on this examination.  Additional areas of mild and moderate stenosis as described above.  2. Stable appearance of a 1.1 cm pulmonary nodule in the right middle  lung lobe.  3. Additional findings as noted above.         Assessment/Plan   Diagnoses and all orders for this visit:  Atherosclerosis of native coronary artery of native heart with angina pectoris (CMS-HCC)  Hypertension, unspecified type  Chronic diastolic congestive heart failure (Multi)  Atherosclerosis of native coronary artery of native heart without angina pectoris  Hyperlipidemia, unspecified hyperlipidemia type  -     Lipid panel; Future  Bilateral carotid artery stenosis    In summary, Mr. Tai is a pleasant 89 year-old white male with a past medical history significant for coronary artery disease status post remote PCI in the setting of a myocardial infarction with preserved LV function, hypertension, hyperlipidemia, carotid atherosclerosis with a questionable remote TIA, COPD, chronic aspiration, prior perioperative DVT/PE, prior tobacco use, aortic stenosis, chronic diastolic heart failure, type II non-insulin-requiring diabetes, peripheral arterial disease, and multiple intracranial masses.  He is stable from a cardiac perspective.  He has no change in his chronic dyspnea on exertion that is multifactorial in the setting of COPD, chronic aspiration, chronic diastolic heart failure, and CAD.  He appears euvolemic on exam.  I did discontinue his isosorbide given his low blood pressure.  He is not certain if he notices a difference when taking it.  I did add a fasting lipid profile to his recent labs.  He should continue his other cardiovascular medications.  We will see him back in follow-up in 6 months.      Orders:  Orders Placed This Encounter   Procedures    Lipid panel      Followup Appts:  Future Appointments   Date Time Provider Department Center   10/10/2024  8:30 AM Suellen Waite MD [id number] [facility]    10/31/2024  9:45 AM Duy Kim, CHELSIE [id number] [facility]   2/5/2025 12:00 PM Jelani Hawk MD [id number] [facility]   2/18/2025 11:00 AM POR MRI PORMRI [facility]   2/18/2025 11:45 AM POR MRI PORMRI [facility]   3/7/2025 11:00 AM Malini Low MD [id number] [facility]   3/13/2025 10:30 AM [facility] AUD[facility] CCC-A [id number]   3/13/2025 11:00 AM Sherrie Downey MD [id number] [place]   5/8/2025  9:00 [facility]   5/8/2025 10:00 [facility]   5/15/2025 10:00 AM Miguelina Mendez, APRN-CNP, DNP [id number] [place]           ____________________________________________________________  Neo Bass MD  [facility]  [facility]

## 2024-09-18 NOTE — TELEPHONE ENCOUNTER
Patient was here yesterday for office visit. His BP was low so Dr. Bass insturcted him to stop his isosorbide. He didn't take it today and he feels more short of breath and he feels his chest pounding harder than normal. Significant other thinks he should go back on this medication? Would like a call back.

## 2024-09-19 DIAGNOSIS — I25.119 ATHEROSCLEROSIS OF NATIVE CORONARY ARTERY OF NATIVE HEART WITH ANGINA PECTORIS: ICD-10-CM

## 2024-09-19 RX ORDER — ISOSORBIDE MONONITRATE 60 MG/1
60 TABLET, EXTENDED RELEASE ORAL
Qty: 90 TABLET | Refills: 3 | Status: SHIPPED | OUTPATIENT
Start: 2024-09-19 | End: 2025-09-19

## 2024-09-20 LAB
AORTIC VALVE MEAN GRADIENT: 8.8 MMHG
AORTIC VALVE PEAK VELOCITY: 2.05 M/S
AV PEAK GRADIENT: 16.8 MMHG
AVA (PEAK VEL): 1.4 CM2
AVA (VTI): 1.42 CM2
EJECTION FRACTION APICAL 4 CHAMBER: 52.1
EJECTION FRACTION: 68 %
LEFT ATRIUM VOLUME AREA LENGTH INDEX BSA: 39.9 ML/M2
LEFT VENTRICLE INTERNAL DIMENSION DIASTOLE: 4.73 CM (ref 3.5–6)
LEFT VENTRICULAR OUTFLOW TRACT DIAMETER: 2.02 CM
MITRAL VALVE E/A RATIO: 1.27
RIGHT VENTRICLE PEAK SYSTOLIC PRESSURE: 27 MMHG
TRICUSPID ANNULAR PLANE SYSTOLIC EXCURSION: 3.1 CM

## 2024-10-03 ENCOUNTER — APPOINTMENT (OUTPATIENT)
Dept: PRIMARY CARE | Facility: CLINIC | Age: 89
End: 2024-10-03
Payer: MEDICARE

## 2024-10-10 ENCOUNTER — APPOINTMENT (OUTPATIENT)
Dept: PRIMARY CARE | Facility: CLINIC | Age: 89
End: 2024-10-10
Payer: MEDICARE

## 2024-10-10 VITALS
BODY MASS INDEX: 28.98 KG/M2 | WEIGHT: 191.2 LBS | DIASTOLIC BLOOD PRESSURE: 64 MMHG | SYSTOLIC BLOOD PRESSURE: 128 MMHG | HEIGHT: 68 IN

## 2024-10-10 DIAGNOSIS — Z00.00 REGULAR CHECK-UP: Primary | ICD-10-CM

## 2024-10-10 DIAGNOSIS — G95.9 CERVICAL MYELOPATHY: ICD-10-CM

## 2024-10-10 DIAGNOSIS — E11.9 TYPE 2 DIABETES MELLITUS WITHOUT COMPLICATION, WITHOUT LONG-TERM CURRENT USE OF INSULIN (MULTI): ICD-10-CM

## 2024-10-10 DIAGNOSIS — L03.90 CELLULITIS, UNSPECIFIED CELLULITIS SITE: ICD-10-CM

## 2024-10-10 DIAGNOSIS — F33.42 RECURRENT MAJOR DEPRESSIVE DISORDER, IN FULL REMISSION (CMS-HCC): ICD-10-CM

## 2024-10-10 DIAGNOSIS — K21.9 GASTROESOPHAGEAL REFLUX DISEASE, UNSPECIFIED WHETHER ESOPHAGITIS PRESENT: ICD-10-CM

## 2024-10-10 DIAGNOSIS — Z23 IMMUNIZATION DUE: ICD-10-CM

## 2024-10-10 DIAGNOSIS — G40.89 OTHER SEIZURES (MULTI): ICD-10-CM

## 2024-10-10 PROCEDURE — 1160F RVW MEDS BY RX/DR IN RCRD: CPT | Performed by: INTERNAL MEDICINE

## 2024-10-10 PROCEDURE — 3074F SYST BP LT 130 MM HG: CPT | Performed by: INTERNAL MEDICINE

## 2024-10-10 PROCEDURE — G0008 ADMIN INFLUENZA VIRUS VAC: HCPCS | Performed by: INTERNAL MEDICINE

## 2024-10-10 PROCEDURE — G0439 PPPS, SUBSEQ VISIT: HCPCS | Performed by: INTERNAL MEDICINE

## 2024-10-10 PROCEDURE — 1170F FXNL STATUS ASSESSED: CPT | Performed by: INTERNAL MEDICINE

## 2024-10-10 PROCEDURE — 90662 IIV NO PRSV INCREASED AG IM: CPT | Performed by: INTERNAL MEDICINE

## 2024-10-10 PROCEDURE — 1157F ADVNC CARE PLAN IN RCRD: CPT | Performed by: INTERNAL MEDICINE

## 2024-10-10 PROCEDURE — 3078F DIAST BP <80 MM HG: CPT | Performed by: INTERNAL MEDICINE

## 2024-10-10 PROCEDURE — 1158F ADVNC CARE PLAN TLK DOCD: CPT | Performed by: INTERNAL MEDICINE

## 2024-10-10 PROCEDURE — 1159F MED LIST DOCD IN RCRD: CPT | Performed by: INTERNAL MEDICINE

## 2024-10-10 PROCEDURE — 1123F ACP DISCUSS/DSCN MKR DOCD: CPT | Performed by: INTERNAL MEDICINE

## 2024-10-10 PROCEDURE — 99214 OFFICE O/P EST MOD 30 MIN: CPT | Performed by: INTERNAL MEDICINE

## 2024-10-10 RX ORDER — DOXYCYCLINE 100 MG/1
100 CAPSULE ORAL 2 TIMES DAILY
Qty: 10 CAPSULE | Refills: 0 | Status: SHIPPED | OUTPATIENT
Start: 2024-10-10 | End: 2024-10-15

## 2024-10-10 RX ORDER — ESCITALOPRAM OXALATE 10 MG/1
10 TABLET ORAL DAILY
Qty: 90 TABLET | Refills: 3 | Status: SHIPPED | OUTPATIENT
Start: 2024-10-10 | End: 2025-10-10

## 2024-10-10 RX ORDER — MUPIROCIN 20 MG/G
OINTMENT TOPICAL 2 TIMES DAILY
Qty: 22 G | Refills: 0 | Status: SHIPPED | OUTPATIENT
Start: 2024-10-10 | End: 2024-10-20

## 2024-10-10 RX ORDER — FAMOTIDINE 20 MG/1
20 TABLET, FILM COATED ORAL 2 TIMES DAILY
Qty: 180 TABLET | Refills: 3 | Status: SHIPPED | OUTPATIENT
Start: 2024-10-10 | End: 2025-10-10

## 2024-10-10 RX ORDER — GABAPENTIN 300 MG/1
300 CAPSULE ORAL 2 TIMES DAILY
Qty: 180 CAPSULE | Refills: 3 | Status: SHIPPED | OUTPATIENT
Start: 2024-10-10 | End: 2025-10-10

## 2024-10-10 ASSESSMENT — PATIENT HEALTH QUESTIONNAIRE - PHQ9
SUM OF ALL RESPONSES TO PHQ9 QUESTIONS 1 AND 2: 6
1. LITTLE INTEREST OR PLEASURE IN DOING THINGS: NEARLY EVERY DAY
SUM OF ALL RESPONSES TO PHQ9 QUESTIONS 1 AND 2: 0
2. FEELING DOWN, DEPRESSED OR HOPELESS: NEARLY EVERY DAY
1. LITTLE INTEREST OR PLEASURE IN DOING THINGS: NOT AT ALL
2. FEELING DOWN, DEPRESSED OR HOPELESS: NOT AT ALL

## 2024-10-10 ASSESSMENT — ACTIVITIES OF DAILY LIVING (ADL)
BATHING: INDEPENDENT
MANAGING_FINANCES: INDEPENDENT
GROCERY_SHOPPING: INDEPENDENT
DRESSING: INDEPENDENT
TAKING_MEDICATION: INDEPENDENT
DOING_HOUSEWORK: INDEPENDENT

## 2024-10-10 NOTE — PROGRESS NOTES
"Subjective   Reason for Visit: Gregory Tai is an 89 y.o. male here for a Medicare Wellness visit.     Past Medical, Surgical, and Family History reviewed and updated in chart.    Reviewed all medications by prescribing practitioner or clinical pharmacist (such as prescriptions, OTCs, herbal therapies and supplements) and documented in the medical record.    HPI    Patient Care Team:  Suellen Waite MD as PCP - General  Suellen Waite MD as PCP - MSSP ACO Attributed Provider  MOSES Antunez-CNP as Nurse Practitioner (Cardiology)  Neo Bass MD as Consulting Physician (Cardiology)     Review of Systems    Objective   Vitals:  /64   Ht 1.715 m (5' 7.5\")   Wt 86.7 kg (191 lb 3.2 oz)   BMI 29.50 kg/m²       Physical Exam  Constitutional:       Appearance: Normal appearance.   Cardiovascular:      Rate and Rhythm: Normal rate and regular rhythm.      Pulses: Normal pulses.      Heart sounds: No murmur heard.     No gallop.   Pulmonary:      Effort: Pulmonary effort is normal. No respiratory distress.      Breath sounds: Normal breath sounds. No wheezing, rhonchi or rales.   Neurological:      Mental Status: He is alert.   Psychiatric:         Mood and Affect: Mood normal.         Behavior: Behavior normal.         Thought Content: Thought content normal.         Judgment: Judgment normal.             Lab Results   Component Value Date    WBC 9.5 09/11/2024    HGB 12.0 (L) 09/11/2024    HCT 37.8 (L) 09/11/2024     09/11/2024    CHOL 110 09/11/2024    TRIG 84 09/11/2024    HDL 46.1 09/11/2024    ALT 32 09/27/2023    AST 22 03/15/2022     09/11/2024    K 4.1 09/11/2024     09/11/2024    CREATININE 1.32 (H) 09/11/2024    BUN 36 (H) 09/11/2024    CO2 31 09/11/2024    TSH 1.89 11/20/2023    INR 1.0 11/06/2019    HGBA1C 6.6 (H) 05/07/2024     === 05/29/24 ===    MR IAC WO AND W CONTRAST    - Impression -  1. There is an enhancing lesion within the left cerebellar pontine  angle cistern " extending into the left internal auditory canal  suggestive of a vestibular schwannoma.  2. Extra-axial enhancing mass along the left interhemispheric falx  posteriorly has increased in size compared to the prior exam  09/06/2016, and likely represents a meningioma.  3. Extra-axial enhancing lesion along the left parietal calvarium  measures 1.3 x 0.6 cm which likely represents a meningioma.  4. Tubular enhancing structure within the superior orbit on the right  may represent a venous varix    MACRO:  None    Signed by: Nirmala Erazo 5/29/2024 2:47 PM  Dictation workstation:   [id number]    Assessment & Plan  Gastroesophageal reflux disease, unspecified whether esophagitis present    Orders:    famotidine (Pepcid) 20 mg tablet; Take 1 tablet (20 mg) by mouth 2 times a day.    Recurrent major depressive disorder, in full remission (CMS-HCC)    Orders:    escitalopram (Lexapro) 10 mg tablet; Take 1 tablet (10 mg) by mouth once daily.    Cervical myelopathy    Orders:    gabapentin (Neurontin) 300 mg capsule; Take 1 capsule (300 mg) by mouth 2 times a day.    Immunization due    Orders:    Flu vaccine, trivalent, preservative free, HIGH-DOSE, age 65y+ (Fluzone)        #1 lt arm pain/ lt biceps tendon rupture- resolved. f/u ortho prn  #2 DM- stable. Spent significant time reviewing low sugar, reduced carbohydrate diet with exercise. Retest, will hold off rx as a1c <7.5 (goal)  #3 BPH- stable. Followup urology  #4 CAD-stable class I. Follow-up cardiology   #5 COPD/chronic bronchitis/LARON- remains an issue. f/u pulm ([facility]) /infectious disease ([facility]).   #6 depression- stable. b  #7 hyperlipidemia- good  #8 carotid disease - f/u vasc surg   #9 pulmonary nodule- stable x2 yrs. f/u CT w/ pulm ([facility])  #10 abd wall pain- resolved.  #11 htn- good. 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 f/u surgery.  #13 anemia/mild thrombocytopenia- stable/mild. s/p heme eval--> iron def. Off iron for now.   heme states no follow-up  "needed except follow-up lab works. retest   #14 cough- stable, but remains a major issue, perhaps a little better. Apparently chronic bronchitis. Follow pulmonology  #15 headache- stable. f/u neuro  #16 back pain/neuropathic pain- s/p epidurals. f/u neuro. strongly rec PT.  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  #19 GB dz- doing well  #20 esophageal spasm/web- Follow-up GI  #21 CTS- reviewed. f/u hand ortho.   #22 CKD- f/u Dr Lucio (neph).   #23 ? sinus issue/smell - resolved  #24 hyponatremia- apparently due to Trileptal plus likely combination of pulmonary disease/CHF. follow-up w/ dr lucio.  #25 pneumonia- resolved. f/u pulm  #26 CHF w/ preserved EF- stable. daily wt's. f/u cards.  #27 CTS   #28 rt LE claudication- f/u  vasc surgery  #29 \"episodes\" (last 11/16/23) - ? Etiology.  No episodes x 2+mths. . ? Sz.  Per neuro on Keppra.  f/u  neuro.  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    rec COVID vaccine ASAP, stressed importance       Patient was identified as a fall risk. Risk prevention instructions provided.  Patient was identified as a fall risk. Risk prevention instructions provided.  Type 2 diabetes mellitus without complication, without long-term current use of insulin (Multi)    Orders:    Hemoglobin A1C; Future    Cellulitis, unspecified cellulitis site    Orders:    doxycycline (Vibramycin) 100 mg capsule; Take 1 capsule (100 mg) by mouth 2 times a day for 5 days. Take with at least 8 ounces (large glass) of water, do not lie down for 30 minutes after    mupirocin (Bactroban) 2 % ointment; Apply topically 2 times a day for 10 days. apply to affected area              "

## 2024-10-11 PROBLEM — G40.89 OTHER SEIZURES (MULTI): Status: ACTIVE | Noted: 2024-10-11

## 2024-10-11 ASSESSMENT — ACTIVITIES OF DAILY LIVING (ADL)
DOING_HOUSEWORK: NEEDS ASSISTANCE
DRESSING: INDEPENDENT
BATHING: INDEPENDENT
GROCERY_SHOPPING: INDEPENDENT
TAKING_MEDICATION: INDEPENDENT
MANAGING_FINANCES: INDEPENDENT

## 2024-10-15 ENCOUNTER — TELEPHONE (OUTPATIENT)
Dept: PRIMARY CARE | Facility: CLINIC | Age: 89
End: 2024-10-15
Payer: MEDICARE

## 2024-10-15 NOTE — TELEPHONE ENCOUNTER
Luisa (POSARIKA), called with update on Gregory's wound and rash.  - Still has a bit of a rash, but not as severe or as hot as it was before.  - The wound is till open, not healed yet.  - Has 3 putules that, in her opinion, are still open.  - He finished the Doxycycline.  - What should she put on the wounds?  They have some Mupirocin cream.  Should she use this?  - She said she is not bandaging the wound today, was letting air get to it.  Not sure if that's right or wrong.  Has a couple Band-aids on 2 other areas.    Please advise.  She can be reached at [phone].

## 2024-10-22 ENCOUNTER — LAB (OUTPATIENT)
Dept: LAB | Facility: LAB | Age: 89
End: 2024-10-22
Payer: MEDICARE

## 2024-10-22 ENCOUNTER — OFFICE VISIT (OUTPATIENT)
Dept: PRIMARY CARE | Facility: CLINIC | Age: 89
End: 2024-10-22
Payer: MEDICARE

## 2024-10-22 VITALS
TEMPERATURE: 97.9 F | WEIGHT: 193.2 LBS | SYSTOLIC BLOOD PRESSURE: 120 MMHG | BODY MASS INDEX: 29.81 KG/M2 | DIASTOLIC BLOOD PRESSURE: 60 MMHG

## 2024-10-22 DIAGNOSIS — B35.6 TINEA CRURIS: Primary | ICD-10-CM

## 2024-10-22 DIAGNOSIS — E11.9 TYPE 2 DIABETES MELLITUS WITHOUT COMPLICATION, WITHOUT LONG-TERM CURRENT USE OF INSULIN (MULTI): ICD-10-CM

## 2024-10-22 PROCEDURE — 1159F MED LIST DOCD IN RCRD: CPT | Performed by: NURSE PRACTITIONER

## 2024-10-22 PROCEDURE — 1157F ADVNC CARE PLAN IN RCRD: CPT | Performed by: NURSE PRACTITIONER

## 2024-10-22 PROCEDURE — 3078F DIAST BP <80 MM HG: CPT | Performed by: NURSE PRACTITIONER

## 2024-10-22 PROCEDURE — 1123F ACP DISCUSS/DSCN MKR DOCD: CPT | Performed by: NURSE PRACTITIONER

## 2024-10-22 PROCEDURE — 99213 OFFICE O/P EST LOW 20 MIN: CPT | Performed by: NURSE PRACTITIONER

## 2024-10-22 PROCEDURE — 1160F RVW MEDS BY RX/DR IN RCRD: CPT | Performed by: NURSE PRACTITIONER

## 2024-10-22 PROCEDURE — 3074F SYST BP LT 130 MM HG: CPT | Performed by: NURSE PRACTITIONER

## 2024-10-22 PROCEDURE — 1036F TOBACCO NON-USER: CPT | Performed by: NURSE PRACTITIONER

## 2024-10-22 PROCEDURE — 36415 COLL VENOUS BLD VENIPUNCTURE: CPT

## 2024-10-22 PROCEDURE — 83036 HEMOGLOBIN GLYCOSYLATED A1C: CPT

## 2024-10-22 RX ORDER — NYSTATIN 100000 U/G
CREAM TOPICAL 2 TIMES DAILY
Qty: 30 G | Refills: 0 | Status: SHIPPED | OUTPATIENT
Start: 2024-10-22 | End: 2024-10-29

## 2024-10-22 ASSESSMENT — ENCOUNTER SYMPTOMS
MUSCULOSKELETAL NEGATIVE: 1
CONSTITUTIONAL NEGATIVE: 1
SHORTNESS OF BREATH: 0
NEUROLOGICAL NEGATIVE: 1
CARDIOVASCULAR NEGATIVE: 1
PSYCHIATRIC NEGATIVE: 1
ROS SKIN COMMENTS: AS NOTED IN HPI

## 2024-10-22 NOTE — PROGRESS NOTES
Subjective   Patient ID: Gregory Tai is a 89 y.o. male who presents for Rash.    HPI Presents today with rash around his rectum and on his left testicle.   He says it itches and burns.   Has been there 3-4 days.  Just finished doxycycline.   Does wear depends for incontinence    Review of Systems   Constitutional: Negative.    Respiratory:  Negative for shortness of breath.    Cardiovascular: Negative.    Musculoskeletal: Negative.    Skin:         As noted in HPI     Neurological: Negative.    Psychiatric/Behavioral: Negative.         Objective   /60   Temp 36.6 °C (97.9 °F)   Wt 87.6 kg (193 lb 3.2 oz)   BMI 29.81 kg/m²     Physical Exam  Constitutional:       Appearance: Normal appearance.   Cardiovascular:      Rate and Rhythm: Normal rate and regular rhythm.   Pulmonary:      Effort: Pulmonary effort is normal.      Breath sounds: Normal breath sounds.   Musculoskeletal:         General: Normal range of motion.   Skin:     General: Skin is warm.      Comments: 2-3 cm erythematous area around rectum with demarcation to normal skin.    Left testicle with erythema   Neurological:      General: No focal deficit present.      Mental Status: He is alert.   Psychiatric:         Mood and Affect: Mood normal.         Behavior: Behavior normal.         Assessment/Plan   Problem List Items Addressed This Visit             ICD-10-CM    Tinea cruris - Primary B35.6    Relevant Medications    nystatin (Mycostatin) cream

## 2024-10-23 LAB
EST. AVERAGE GLUCOSE BLD GHB EST-MCNC: 140 MG/DL
HBA1C MFR BLD: 6.5 %

## 2024-10-31 ENCOUNTER — APPOINTMENT (OUTPATIENT)
Dept: OPHTHALMOLOGY | Facility: CLINIC | Age: 89
End: 2024-10-31
Payer: MEDICARE

## 2024-10-31 DIAGNOSIS — Z96.1 PSEUDOPHAKIA OF BOTH EYES: ICD-10-CM

## 2024-10-31 DIAGNOSIS — H16.223 KERATITIS SICCA, BILATERAL: ICD-10-CM

## 2024-10-31 DIAGNOSIS — H04.123 BILATERAL DRY EYES: ICD-10-CM

## 2024-10-31 DIAGNOSIS — H35.371 EPIRETINAL MEMBRANE (ERM) OF RIGHT EYE: Primary | ICD-10-CM

## 2024-10-31 DIAGNOSIS — H35.30 AMD (AGE-RELATED MACULAR DEGENERATION), BILATERAL: ICD-10-CM

## 2024-10-31 PROCEDURE — 92134 CPTRZ OPH DX IMG PST SGM RTA: CPT | Performed by: OPTOMETRIST

## 2024-10-31 PROCEDURE — 83861 MICROFLUID ANALY TEARS: CPT | Performed by: OPTOMETRIST

## 2024-10-31 PROCEDURE — 83516 IMMUNOASSAY NONANTIBODY: CPT | Performed by: OPTOMETRIST

## 2024-10-31 PROCEDURE — 92012 INTRM OPH EXAM EST PATIENT: CPT | Performed by: OPTOMETRIST

## 2024-10-31 ASSESSMENT — REFRACTION_WEARINGRX
OS_CYLINDER: -1.50
OS_ADD: +3.00
SPECS_TYPE: BIFOCAL
OD_SPHERE: +1.50
OS_SPHERE: +1.75
OD_ADD: +3.00
OS_AXIS: 080
OD_CYLINDER: -1.50
OD_AXIS: 135

## 2024-10-31 ASSESSMENT — CUP TO DISC RATIO
OS_RATIO: .3
OD_RATIO: .3

## 2024-10-31 ASSESSMENT — ENCOUNTER SYMPTOMS
NEUROLOGICAL NEGATIVE: 0
ALLERGIC/IMMUNOLOGIC NEGATIVE: 0
CARDIOVASCULAR NEGATIVE: 0
PSYCHIATRIC NEGATIVE: 0
ENDOCRINE NEGATIVE: 0
MUSCULOSKELETAL NEGATIVE: 0
RESPIRATORY NEGATIVE: 0
EYES NEGATIVE: 1
GASTROINTESTINAL NEGATIVE: 0
CONSTITUTIONAL NEGATIVE: 0
HEMATOLOGIC/LYMPHATIC NEGATIVE: 0

## 2024-10-31 ASSESSMENT — REFRACTION_MANIFEST
OS_CYLINDER: -1.75
OD_SPHERE: +0.75
OS_AXIS: 080
OD_CYLINDER: -1.25
OD_ADD: +3.00
OS_ADD: +3.00
OS_SPHERE: +1.50
OD_AXIS: 135

## 2024-10-31 ASSESSMENT — VISUAL ACUITY
OD_CC+: -2
OS_CC+: -1
OS_CC: 20/25
METHOD: SNELLEN - LINEAR
CORRECTION_TYPE: GLASSES
OD_CC: 20/30

## 2024-10-31 ASSESSMENT — SCHIRMERS TEST
OS_SCHIRMERS: 7
OD_SCHIRMERS: 3

## 2024-10-31 ASSESSMENT — TONOMETRY
IOP_METHOD: TONOPEN
OS_IOP_MMHG: 13
OD_IOP_MMHG: 12

## 2024-10-31 ASSESSMENT — EXTERNAL EXAM - LEFT EYE: OS_EXAM: NORMAL

## 2024-10-31 ASSESSMENT — EXTERNAL EXAM - RIGHT EYE: OD_EXAM: NORMAL

## 2025-01-15 ENCOUNTER — LAB (OUTPATIENT)
Dept: LAB | Facility: LAB | Age: 89
End: 2025-01-15
Payer: MEDICARE

## 2025-01-15 DIAGNOSIS — N18.30 CHRONIC KIDNEY DISEASE, STAGE 3 UNSPECIFIED (MULTI): ICD-10-CM

## 2025-01-15 DIAGNOSIS — E78.49 OTHER HYPERLIPIDEMIA: ICD-10-CM

## 2025-01-15 DIAGNOSIS — I25.10 ATHEROSCLEROTIC HEART DISEASE OF NATIVE CORONARY ARTERY WITHOUT ANGINA PECTORIS: Primary | ICD-10-CM

## 2025-01-15 LAB
ALBUMIN SERPL BCP-MCNC: 3.7 G/DL (ref 3.4–5)
ANION GAP SERPL CALC-SCNC: 12 MMOL/L (ref 10–20)
APPEARANCE UR: CLEAR
BILIRUB UR STRIP.AUTO-MCNC: NEGATIVE MG/DL
BUN SERPL-MCNC: 26 MG/DL (ref 6–23)
CALCIUM SERPL-MCNC: 8.9 MG/DL (ref 8.6–10.3)
CHLORIDE SERPL-SCNC: 102 MMOL/L (ref 98–107)
CO2 SERPL-SCNC: 33 MMOL/L (ref 21–32)
COLOR UR: YELLOW
CREAT SERPL-MCNC: 1.43 MG/DL (ref 0.5–1.3)
EGFRCR SERPLBLD CKD-EPI 2021: 47 ML/MIN/1.73M*2
GLUCOSE SERPL-MCNC: 101 MG/DL (ref 74–99)
GLUCOSE UR STRIP.AUTO-MCNC: NORMAL MG/DL
KETONES UR STRIP.AUTO-MCNC: NEGATIVE MG/DL
LEUKOCYTE ESTERASE UR QL STRIP.AUTO: NEGATIVE
NITRITE UR QL STRIP.AUTO: NEGATIVE
PH UR STRIP.AUTO: 6 [PH]
PHOSPHATE SERPL-MCNC: 3.8 MG/DL (ref 2.5–4.9)
POTASSIUM SERPL-SCNC: 4.5 MMOL/L (ref 3.5–5.3)
PROT UR STRIP.AUTO-MCNC: NEGATIVE MG/DL
RBC # UR STRIP.AUTO: NEGATIVE /UL
SODIUM SERPL-SCNC: 142 MMOL/L (ref 136–145)
SP GR UR STRIP.AUTO: 1.01
UROBILINOGEN UR STRIP.AUTO-MCNC: NORMAL MG/DL

## 2025-01-15 PROCEDURE — 80069 RENAL FUNCTION PANEL: CPT

## 2025-01-15 PROCEDURE — 81003 URINALYSIS AUTO W/O SCOPE: CPT

## 2025-01-21 ENCOUNTER — APPOINTMENT (OUTPATIENT)
Dept: NEUROLOGY | Facility: CLINIC | Age: 89
End: 2025-01-21
Payer: MEDICARE

## 2025-01-23 ENCOUNTER — APPOINTMENT (OUTPATIENT)
Dept: NEUROLOGY | Facility: CLINIC | Age: 89
End: 2025-01-23
Payer: MEDICARE

## 2025-01-23 VITALS
HEIGHT: 68 IN | DIASTOLIC BLOOD PRESSURE: 75 MMHG | HEART RATE: 83 BPM | WEIGHT: 194 LBS | BODY MASS INDEX: 29.4 KG/M2 | SYSTOLIC BLOOD PRESSURE: 127 MMHG

## 2025-01-23 DIAGNOSIS — D33.3 VESTIBULAR SCHWANNOMA (MULTI): ICD-10-CM

## 2025-01-23 DIAGNOSIS — D42.9 MULTIPLE MENINGIOMA (MULTI): Primary | ICD-10-CM

## 2025-01-23 DIAGNOSIS — Z87.898 HISTORY OF SEIZURES: ICD-10-CM

## 2025-01-23 DIAGNOSIS — R40.4 TRANSIENT ALTERATION OF AWARENESS: ICD-10-CM

## 2025-01-23 PROCEDURE — 3078F DIAST BP <80 MM HG: CPT | Performed by: PSYCHIATRY & NEUROLOGY

## 2025-01-23 PROCEDURE — 1157F ADVNC CARE PLAN IN RCRD: CPT | Performed by: PSYCHIATRY & NEUROLOGY

## 2025-01-23 PROCEDURE — 99214 OFFICE O/P EST MOD 30 MIN: CPT | Performed by: PSYCHIATRY & NEUROLOGY

## 2025-01-23 PROCEDURE — 1123F ACP DISCUSS/DSCN MKR DOCD: CPT | Performed by: PSYCHIATRY & NEUROLOGY

## 2025-01-23 PROCEDURE — 1036F TOBACCO NON-USER: CPT | Performed by: PSYCHIATRY & NEUROLOGY

## 2025-01-23 PROCEDURE — 3074F SYST BP LT 130 MM HG: CPT | Performed by: PSYCHIATRY & NEUROLOGY

## 2025-01-23 RX ORDER — LEVETIRACETAM 250 MG/1
125 TABLET ORAL NIGHTLY
Qty: 45 TABLET | Refills: 3 | Status: SHIPPED | OUTPATIENT
Start: 2025-01-23

## 2025-01-23 RX ORDER — LEVETIRACETAM 250 MG/1
125 TABLET ORAL NIGHTLY
Qty: 45 TABLET | Refills: 3 | Status: SHIPPED | OUTPATIENT
Start: 2025-01-23 | End: 2025-01-23 | Stop reason: SDUPTHER

## 2025-01-23 NOTE — PROGRESS NOTES
"Subjective     Gregory Tai is a 89 y.o. 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     I evaluated him initially and most recently on 4/25/2024.  As detailed in my note from that date he was referred by cardiology for episodes of altered awareness and recurrent falls.  He carries a remote history of seizure as noted above.    I sent him for a brain MRI and EEG.  The latter study was normal but the MRI showed a number of findings including interval enlargement of a left occipital extra-axial lesion compatible with meningioma compared to prior study from 2016.  Smaller lesion suspicious for meningioma along the left parietal inner table.  Left CP angle lesion potentially extending into the IAC for which a contrasted study with IAC protocol was recommended.  His nephrologist cleared him to receive gadolinium.    The contrasted IAC protocol MRI showed lesions in the left occipital/interhemispheric fissure and left parietal convexity compatible with meningiomas, and did confirm extension of the left CP angle lesion into the IAC, suggestive of vestibular schwannoma.    I provided referrals to neurosurgery and otology and recommended starting Keppra 125 mg twice daily given episodes of loss of awareness concerning for seizure.    He is evaluated again today in the office accompanied by his DAKOTA Moran.    He has had a busy clinical course since last visit, undergoing evaluations by neurosurgery and otology. " " The tumor board recommendation was to repeat imaging in 6 months and he is ordered for contrasted MRIs of the brain and of the IAC.  He is also ordered for audiometry.    Levetiracetam 125 mg twice daily proved far too sedating, but his POA cut it down to 125 mg once nightly and that has worked quite nicely.  He is no longer oversedated.  His POA feels he is not only more alert but also more \"himself\".  She comments that he has good color.  He does not show any side effects of the once nightly dose.    Further, he has had no interval seizure-like events and no additional falls since the last visit.    He does short local drives to and from the [employer] [place] but otherwise is not driving.  He does not do night or highway driving.    He denies frequent or severe headaches.  He does endorse 2 brief episodes (about 20 minutes each) over the past month of sharp left hemicranial pain.    He occasionally feels lightheaded but does not endorse vertiginous dizziness.    He indicates no new visual issues; he is followed by ophthalmology for macular degeneration and dry eye.    He is quite hard of hearing at baseline but is able to communicate as long as the examiner is face-to-face and with a loud voice.    His POA has tried to encourage him to use a cane.  He has canes and walkers at home but balks at using them.        Review of Systems    As per the history of present illness    Patient Active Problem List   Diagnosis    Anemia    Hypertension    PAD (peripheral artery disease) (CMS-[facility])    Benign prostatic hyperplasia    Bilateral carotid artery stenosis    Bilateral occipital neuralgia    Obstructive sleep apnea syndrome    Cervical myelopathy    Chronic CHF (Multi)    Chronic obstructive pulmonary disease, group C, by Global Initiative for Chronic Obstructive Lung Disease 2013 classification (Multi)    CKD (chronic kidney disease) stage 3, GFR 30-59 ml/min (Multi)    Controlled diabetes mellitus with circulatory " complication (Multi)    Atherosclerosis of native coronary artery of native heart with angina pectoris    Mixed simple and mucopurulent chronic bronchitis (Multi)    Nonrheumatic aortic valve stenosis    Reactive airway disease (HHS-HCC)    Recurrent major depressive disorder (CMS-HCC)    Keratitis sicca, bilateral    Actinic keratosis    Angioma    Heart palpitations    Gallbladder stone with nonacute cholecystitis    Esophageal web (HHS-HCC)    Esophageal reflux    Esophageal dysphagia    Epiretinal membrane (ERM) of right eye    Elevated serum creatinine    Edema of extremities    Ear canal dryness, bilateral    Dizziness and giddiness    Disorder of prostate    Diffuse esophageal spasm    Depression    Chronic pain    Concussion with brief LOC    Cervical postlaminectomy syndrome    Chronic cough    Bilateral sensorineural hearing loss    Balanitis    Auditory discrimination impairment, bilateral    Aspiration into airway    Anxiety    AMD (age-related macular degeneration), bilateral    Trigger ring finger of left hand    Trigger middle finger of left hand    Senile purpura (CMS-HCC)    Arthropathy    Sun-damaged skin    Condition not found    Extrapyramidal disease and abnormal movement disorder    History of coronary artery stent placement    Hyperlipidemia    Hyponatremia    Impairment of balance    Left carpal tunnel syndrome    Left inguinal hernia    Lumbar stenosis    Lung nodule    Mixed incontinence    MRSA infection    Myalgia    Neuropathy    Pseudophakia of both eyes    Restless legs syndrome    Smell disorder    Spondylosis without myelopathy or radiculopathy, sacral and sacrococcygeal region    Syncope    Tinea cruris    Urgency of urination    Hemangioma, capillary    Atheroscler of native artery of right leg with intermit claudication (CMS-HCC)    Atherosclerotic peripheral vascular disease (CMS-HCC)    Bilateral dry eyes    Central sleep apnea    Cervical cord myelomalacia    Severe persistent  asthma, uncomplicated (Multi)    Other pulmonary embolism without acute cor pulmonale, unspecified chronicity (Multi)    Astigmatism of both eyes with presbyopia [H52.203, H52.4]    CPA (cerebellopontine angle) tumor (Multi)    Meningioma (Multi)    Other seizures (Multi)     Past Medical History:   Diagnosis Date    Anesthesia of skin     Numbness    Chronic obstructive pulmonary disease with (acute) exacerbation (Multi) 02/19/2013    Obstructive chronic bronchitis with exacerbation    Disease of stomach and duodenum, unspecified     Stomach problems    Dizziness and giddiness     Lightheadedness    Essential (primary) hypertension 09/06/2022    Hypertension, unspecified type    Orchitis 02/19/2013    Orchitis    Other conditions influencing health status 02/19/2013    Meatal stenosis    Other pulmonary embolism without acute cor pulmonale 06/23/2013    Pulmonary embolism    Other specified soft tissue disorders 06/23/2013    Limb swelling    Other spondylosis with myelopathy, cervical region 06/23/2013    Cervical spondylosis with myelopathy    Personal history of other diseases of male genital organs     History of prostate disorder    Personal history of other diseases of the circulatory system 08/26/2020    History of peripheral vascular disease    Personal history of other diseases of the musculoskeletal system and connective tissue     History of arthritis    Personal history of other diseases of the nervous system and sense organs     History of macular degeneration    Personal history of other specified conditions     History of urinary incontinence    Urinary tract infection, site not specified 02/19/2013    Urinary tract infection     Past Surgical History:   Procedure Laterality Date    CATARACT EXTRACTION  11/27/2017    Cataract Surgery    CHOLECYSTECTOMY  11/27/2017    Cholecystectomy    COLONOSCOPY  02/06/2014    Colonoscopy (Fiberoptic)    CT ANGIO AORTA AND BILATERAL ILIOFEMORAL RUNOFF W AND OR WO  IV CONTRAST  6/21/2022    CT AORTA AND BILATERAL ILIOFEMORAL RUNOFF ANGIOGRAM W AND/OR WO IV CONTRAST 6/21/2022 [facility]    CT ANGIO AORTA AND BILATERAL ILIOFEMORAL RUNOFF W AND OR WO IV CONTRAST  7/10/2023    CT AORTA AND BILATERAL ILIOFEMORAL RUNOFF ANGIOGRAM W AND/OR WO IV CONTRAST 7/10/2023 [facility] CT    CT ANGIO NECK  3/12/2013    CT NECK ANGIO W AND WO IV CONTRAST 3/12/2013 [facility] INPATIENT [facility]GAC[facility]    OTHER SURGICAL HISTORY  07/22/2013    Arthrodesis Cervical    OTHER SURGICAL HISTORY  12/10/2013    Surgery    OTHER SURGICAL HISTORY  07/09/2019    Cardiac catheterization with stent placement    OTHER SURGICAL HISTORY  10/02/2020    Blepharoplasty    TONSILLECTOMY  02/03/2014    Tonsillectomy With Adenoidectomy     Social History     Tobacco Use    Smoking status: Former     Types: Cigarettes    Smokeless tobacco: Never   Substance Use Topics    Alcohol use: Not Currently     family history includes Blood clot in his mother; Cancer in his brother and father; Dementia in his mother; Glaucoma in his mother and sister; Hypertension in his mother and sister; Prostate cancer in his father.    Current Outpatient Medications:     acetaminophen (Tylenol) 325 mg tablet, Take 1-2 tablets (325-650 mg) by mouth every 6 hours., Disp: , Rfl:     aspirin 81 mg EC tablet, Take 1 tablet (81 mg) by mouth once daily., Disp: , Rfl:     atorvastatin (Lipitor) 80 mg tablet, Take 1 tablet (80 mg) by mouth once daily at bedtime., Disp: 90 tablet, Rfl: 3    benzonatate (Tessalon) 200 mg capsule, Take 1 capsule (200 mg) by mouth 3 times a day as needed., Disp: , Rfl:     cycloSPORINE (Restasis) 0.05 % ophthalmic emulsion, Administer 1 drop into both eyes 2 times a day., Disp: , Rfl:     escitalopram (Lexapro) 10 mg tablet, Take 1 tablet (10 mg) by mouth once daily., Disp: 90 tablet, Rfl: 3    famotidine (Pepcid) 20 mg tablet, Take 1 tablet (20 mg) by mouth 2 times a day., Disp: 180 tablet, Rfl: 3    furosemide (Lasix) 40 mg tablet,  Take 1 tablet (40 mg) by mouth 2 times daily (morning and late afternoon). On Tuesday and Saturday take 2 tablets (80 mg) twice daily per Dr. Oconnell, Disp: , Rfl:     gabapentin (Neurontin) 300 mg capsule, Take 1 capsule (300 mg) by mouth 2 times a day., Disp: 180 capsule, Rfl: 3    ipratropium-albuteroL (Duo-Neb) 0.5-2.5 mg/3 mL nebulizer solution, Take 3 mL by nebulization once daily., Disp: , Rfl:     isosorbide mononitrate ER (Imdur) 60 mg 24 hr tablet, Take 1 tablet (60 mg) by mouth once daily in the morning. Take before meals., Disp: 90 tablet, Rfl: 3    levETIRAcetam (Keppra) 250 mg tablet, Take 0.5 tablets (125 mg) by mouth 2 times a day. (Patient taking differently: Take 0.5 tablets (125 mg) by mouth once daily.), Disp: 90 tablet, Rfl: 2    metoprolol tartrate (Lopressor) 25 mg tablet, TAKE 1 TABLET BY MOUTH EVERY 12 HOURS (Patient taking differently: Take 0.5 tablets (12.5 mg) by mouth every 12 hours.), Disp: 180 tablet, Rfl: 3    montelukast (Singulair) 10 mg tablet, Take 1 tablet (10 mg) by mouth once daily., Disp: , Rfl:     olopatadine (Patanol) 0.1 % ophthalmic solution, 1 drop 2 times a day., Disp: , Rfl:     pseudoephedrine-guaifenesin (Mucinex D)  mg 12 hr tablet, Take by mouth every 12 hours., Disp: , Rfl:     spironolactone (Aldactone) 25 mg tablet, Take 1 tablet (25 mg) by mouth once daily., Disp: 90 tablet, Rfl: 3    Stiolto Respimat 2.5-2.5 mcg/actuation mist inhaler, Inhale 2 Inhalations once daily., Disp: , Rfl:   Allergies   Allergen Reactions    Adhesive Tape-Silicones Unknown    Amoxicillin Diarrhea    Amoxicillin-Pot Clavulanate Unknown    Azelastine Unknown    Beconase Aq [Beclomethasone Diprop (Aq)] Other     Nose bleed     Clopidogrel Bisulfate Unknown    Cromolyn Swelling and Other     NASALCROM - NOSE BLEEDS    Cromolyn Sodium Other    Fluticasone Propionate Unknown    Guaifenesin Unknown    Levofloxacin In D5w Unknown    Nasalcrom A Swelling     (nose)    Oxcarbazepine Unknown     Oxycodone-Acetaminophen Swelling and Other     Anxiety, unable to sleep, agitation     Paxil [Paroxetine Hcl] Other     Agitation, Jittery     Phenylephrine-Guaifenesin Swelling and Other    Phenylpropanolamine Hcl Unknown    Pseudoephedrine Other     ENTEX LA - JITTERY; Agitation      Sulfabenzamide Unknown    Tolterodine Swelling     swelling of hands    Venlafaxine Swelling and Other     Behavior changes     Ace Inhibitors Rash     Dry cough    Corticosteroids (Glucocorticoids) GI bleeding, Rash, Swelling and Other     swelling - BECONASE    Heavy nose bleed    Epinephrine Rash, Swelling and Other     Tachycardia    Karaya Gum Rash    Levofloxacin Palpitations, Rash, Other and Dizziness     Irritability     Olopatadine Rash, Swelling and Other     Serious sinus infection    Oxybutynin Chloride Rash and Other    Paroxetine Swelling, Palpitations and Other    Phenylpropanolamine Nausea And Vomiting    Sulfamethoxazole-Trimethoprim Swelling, Rash, Other and Headache     Severe headache, neck pain, aseptic meningitis       Objective   Neurological Exam  Physical Exam    Physical Examination:    General: Alert man who was ambulatory without assistive devices.      Mental Status: Clear sensorium without fluctuation.  Appropriate in conversation.  Fluent unremarkable speech without paraphasic errors or hesitancy.  He followed instructions accurately and promptly once his hearing loss was overcome.    Cranial Nerves:  Funduscopic exam was not well visualized bilaterally on nondilated exam.  Pupils were equal, round and reactive to light with no relative afferent pupillary defect.  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  No nystagmus.  No ptosis.  Visual fields were full to confrontation tested binocularly.   Facial motor function was symmetrically intact.  Moderately hard of hearing with hearing  aids in place.  No dysarthria.  Shoulder shrug was symmetric.  Tongue protrusion was midline.    Coordination: Finger to finger was accurate bilaterally without dysmetria or intention tremor.  No postural or rest tremor, myoclonus or dystonic posturing.    Sensation: Romberg sign was absent.     Station: Intact and stable.    Gait: Stable and unremarkable except for a mildly slow pace.  No shuffling, freezing or festination.    Assessment/Plan     He was oversedated on levetiracetam 125 mg twice daily but has had an excellent response to 125 mg nightly, which he tolerates well.  As noted above, he has had no recurrence of seizure-like events and no further falls.  His POA is quite gratified by the response.    I sent a refill order for levetiracetam 125 mg nightly to his retail pharmacy.    He has been doing short local drives during the daytime without incident and I advised his POA that driving should be limited to this context.  I specifically advised against highway or night driving.    His POA indicates a plan to follow-up with MRIs of the brain and IAC as recommended by otology and neurosurgery.  However, as she seemed somewhat unsure how this should all be coordinated I recommended, in addition to continued follow-up with otology, that he see neuro-oncology (Dr. Dariusz Moore) and I provided a referral.

## 2025-01-23 NOTE — PATIENT INSTRUCTIONS
I'm glad to hear that Gregory is doing well on half a tablet of Keppra nightly.    As you indicated that there has been no seizure-like events and no further falls since the last visit here several months ago, and he has been doing short local daytime drives without incident, I think it is reasonable to continue occasional short local daytime driving.  As we discussed, I advise against highway driving and also advise against night driving.    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    I am providing a referral to Dr. Moore, neuro-oncology, for future neurology follow-up and management.

## 2025-02-03 PROCEDURE — 80053 COMPREHEN METABOLIC PANEL: CPT

## 2025-02-04 ENCOUNTER — LAB (OUTPATIENT)
Dept: LAB | Facility: HOSPITAL | Age: 89
End: 2025-02-04
Payer: MEDICARE

## 2025-02-04 DIAGNOSIS — D32.9 MENINGIOMA (MULTI): ICD-10-CM

## 2025-02-04 DIAGNOSIS — D36.10 SCHWANNOMA: ICD-10-CM

## 2025-02-04 LAB
ALBUMIN SERPL BCP-MCNC: 3.6 G/DL (ref 3.4–5)
ALP SERPL-CCNC: 47 U/L (ref 33–136)
ALT SERPL W P-5'-P-CCNC: 23 U/L (ref 10–52)
ANION GAP SERPL CALC-SCNC: 11 MMOL/L (ref 10–20)
AST SERPL W P-5'-P-CCNC: 23 U/L (ref 9–39)
BILIRUB SERPL-MCNC: 0.4 MG/DL (ref 0–1.2)
BUN SERPL-MCNC: 27 MG/DL (ref 6–23)
CALCIUM SERPL-MCNC: 9.2 MG/DL (ref 8.6–10.6)
CHLORIDE SERPL-SCNC: 101 MMOL/L (ref 98–107)
CO2 SERPL-SCNC: 35 MMOL/L (ref 21–32)
CREAT SERPL-MCNC: 1.36 MG/DL (ref 0.5–1.3)
EGFRCR SERPLBLD CKD-EPI 2021: 50 ML/MIN/1.73M*2
GLUCOSE SERPL-MCNC: 114 MG/DL (ref 74–99)
POTASSIUM SERPL-SCNC: 4.6 MMOL/L (ref 3.5–5.3)
PROT SERPL-MCNC: 5.4 G/DL (ref 6.4–8.2)
SODIUM SERPL-SCNC: 142 MMOL/L (ref 136–145)

## 2025-02-05 ENCOUNTER — APPOINTMENT (OUTPATIENT)
Dept: NEUROLOGY | Facility: CLINIC | Age: 89
End: 2025-02-05
Payer: MEDICARE

## 2025-02-18 ENCOUNTER — HOSPITAL ENCOUNTER (OUTPATIENT)
Dept: RADIOLOGY | Facility: HOSPITAL | Age: 89
Discharge: HOME | End: 2025-02-18
Payer: MEDICARE

## 2025-02-18 ENCOUNTER — HOSPITAL ENCOUNTER (OUTPATIENT)
Dept: RADIOLOGY | Facility: HOSPITAL | Age: 89
End: 2025-02-18
Payer: MEDICARE

## 2025-02-18 DIAGNOSIS — D32.9 MENINGIOMA (MULTI): ICD-10-CM

## 2025-02-18 DIAGNOSIS — D33.3 VESTIBULAR SCHWANNOMA (MULTI): ICD-10-CM

## 2025-02-18 DIAGNOSIS — D36.10 SCHWANNOMA: ICD-10-CM

## 2025-02-18 PROCEDURE — 2550000001 HC RX 255 CONTRASTS: Performed by: OTOLARYNGOLOGY

## 2025-02-18 PROCEDURE — 70553 MRI BRAIN STEM W/O & W/DYE: CPT

## 2025-02-18 PROCEDURE — 70553 MRI BRAIN STEM W/O & W/DYE: CPT | Performed by: RADIOLOGY

## 2025-02-18 PROCEDURE — A9575 INJ GADOTERATE MEGLUMI 0.1ML: HCPCS | Performed by: OTOLARYNGOLOGY

## 2025-02-18 RX ORDER — GADOTERATE MEGLUMINE 376.9 MG/ML
0.2 INJECTION INTRAVENOUS
Status: COMPLETED | OUTPATIENT
Start: 2025-02-18 | End: 2025-02-18

## 2025-02-18 RX ADMIN — GADOTERATE MEGLUMINE 17 ML: 376.9 INJECTION INTRAVENOUS at 12:03

## 2025-02-20 ENCOUNTER — HOSPITAL ENCOUNTER (OUTPATIENT)
Dept: CARDIOLOGY | Facility: HOSPITAL | Age: 89
Discharge: HOME | End: 2025-02-20
Payer: MEDICARE

## 2025-02-20 PROCEDURE — 93005 ELECTROCARDIOGRAM TRACING: CPT

## 2025-02-21 DIAGNOSIS — K21.9 GASTROESOPHAGEAL REFLUX DISEASE, UNSPECIFIED WHETHER ESOPHAGITIS PRESENT: ICD-10-CM

## 2025-02-21 LAB
ATRIAL RATE: 82 BPM
P AXIS: 59 DEGREES
P OFFSET: 207 MS
P ONSET: 149 MS
PR INTERVAL: 140 MS
Q ONSET: 219 MS
QRS COUNT: 13 BEATS
QRS DURATION: 92 MS
QT INTERVAL: 402 MS
QTC CALCULATION(BAZETT): 469 MS
QTC FREDERICIA: 446 MS
R AXIS: 72 DEGREES
T AXIS: 7 DEGREES
T OFFSET: 420 MS
VENTRICULAR RATE: 82 BPM

## 2025-02-21 RX ORDER — FAMOTIDINE 20 MG/1
20 TABLET, FILM COATED ORAL 2 TIMES DAILY
Qty: 180 TABLET | Refills: 3 | Status: SHIPPED | OUTPATIENT
Start: 2025-02-21 | End: 2026-02-21

## 2025-03-05 NOTE — PROGRESS NOTES
Radiation Oncology Follow-Up    Patient Name:  Gregory Tai  MRN:  [id number]  :  1935    Referring Provider: Malini Low MD PhD  Primary Care Provider: Suellen Waite MD  Care Team: Patient Care Team:  Suellen Waite MD as PCP - General  Suellen Waite MD as PCP - [facility] Attributed Provider  MOSES Antunez-CNP as Nurse Practitioner (Cardiology)  Neo Bass MD as Consulting Physician (Cardiology)    Date of Service: 3/7/2025    SUBJECTIVE  History of Present Illness:  Gregory Tai is a 89 y.o. 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 The patient was last seen in the clinic on 2024. At that time, his case was discussed in the CNS tumor board and consensus was to repeat MRI in 6 months and for patient to establish care with ENT.     Since last visit, the patient was seen by Dr. Downey, and watchful observation was considered. He was also seen by Neurology, and is currently on Levetiracetam 125 mg at bedtime. He had a repeated MRI on 2025 that showed:    - Impression -  1. 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  2. Extra-axial enhancing lesions likely meningiomas left parasagittal  occipital region and lateral left perirolandic convexity are not  significantly changed.  3. Nonspecific white matter FLAIR signal increase most likely  related  to mild-to-moderate chronic small vessel ischemic change. Presence of  old lacunar infarctions not excluded.    During today's interview, we reviewed the patient's most recent MRI and reported to the patient that there is an increase of 0.6 and 0.3 cm with residual normal.  The patient reported a mild increase in his daily imbalance.  He also gets some stabbing brief headaches over the posterior aspect of the skull, that has been more chronic.  There is no urine or stool incontinence.  There is no chest pain or shortness of breath.      Treatment Rendered:   No radiation treatments to show. (Treatments may have been administered in another system.)       Review of Systems:       Pain: The patient's current pain level was assessed.  They report currently having a pain of 8 out of 10.  They feel their pain is not under control without the use of pain medications.  Sharp intense pain right side of skull; short and intense.      Review of Systems:  Review of Systems   Constitutional:  Positive for appetite change (lacks appetite). Negative for chills, diaphoresis, fatigue, fever and unexpected weight change.   HENT:   Positive for hearing loss and tinnitus (increased on left more than right). Negative for mouth sores, nosebleeds, sore throat and trouble swallowing.    Eyes:  Positive for eye problems (wears glasses).   Respiratory:  Positive for cough, shortness of breath and wheezing.         COPD   Cardiovascular:  Positive for chest pain (only with coughing spells) and leg swelling.   Gastrointestinal:  Positive for abdominal pain (abd cramps), constipation and nausea (occasional). Negative for abdominal distention, blood in stool, diarrhea and vomiting.   Genitourinary:  Positive for bladder incontinence and difficulty urinating (stop and starting). Negative for dyspareunia, dysuria, frequency, hematuria, pelvic pain and penile discharge.    Musculoskeletal:  Positive for arthralgias. Negative for gait  problem.        Pains in legs; arthritis   Skin:  Positive for itching and rash (forearms). Negative for wound.   Neurological:  Positive for headaches, light-headedness (getting up quick ; standing up.) and numbness. Negative for dizziness, extremity weakness, gait problem, seizures and speech difficulty.   Hematological:  Bruises/bleeds easily.   Psychiatric/Behavioral:  Negative for confusion, decreased concentration, depression, sleep disturbance and suicidal ideas. The patient is not nervous/anxious.      Performance Status:   The Karnofsky performance scale today is 60, Requires occasional assistance, but is able to care for most of his personal needs (ECOG equivalent 2).       OBJECTIVE  Vital Signs:  There were no vitals taken for this visit.  Physical Exam  Constitutional:       Appearance: Normal appearance. He is normal weight.   HENT:      Ears:      Comments: Decreased hearing.  Neurological:      General: No focal deficit present.      Mental Status: He is alert and oriented to person, place, and time. Mental status is at baseline.      Gait: Gait abnormal.          IMAGING:    === 02/18/25 ===    MR IAC WO AND W CONTRAST    - Impression -  1. 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  2. Extra-axial enhancing lesions likely meningiomas left parasagittal  occipital region and lateral left perirolandic convexity are not  significantly changed.  3. Nonspecific white matter FLAIR signal increase most likely related  to mild-to-moderate chronic small vessel ischemic change. Presence of  old lacunar infarctions not excluded.    MACRO:  None    Signed by: Rigoberto Li 2/18/2025 5:09 PM  Dictation workstation:   [id number]   ASSESSMENT:   Gregory Tai is a 89 y.o. 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 The patient was last seen in the clinic on 8/14/2024. At that time, his case was discussed in the CNS tumor board and consensus was to repeat MRI in 6 months and for patient to establish care with ENT.    I went with the patient and his POA today about the image findings I explained to her that the meningiomas are stable in size.  I also explained to her that there is a 0.6 and 0.3 increase in the size of the vestibular schwannoma.  The increase in size might be contributing to his mildly increased ataxia/imbalance.  We went again over the benefits and the side effects of SRS.    PLAN:    [ ] SRS discussed again today.  Patient/POA to be meeting with Dr. Downey next week before finalizing decision.  [ ] If patient agrees on SRS, we will schedule within the coming [facility].  [ ] If patient prefers on further observation, then patient will continue to follow-up with Dr. Bonner.    NCCN Guidelines were applicable to guide this patients treatment plan.  Malini Low MD PhD

## 2025-03-06 ENCOUNTER — TELEPHONE (OUTPATIENT)
Dept: RADIATION ONCOLOGY | Facility: HOSPITAL | Age: 89
End: 2025-03-06
Payer: MEDICARE

## 2025-03-07 ENCOUNTER — HOSPITAL ENCOUNTER (OUTPATIENT)
Dept: RADIATION ONCOLOGY | Facility: HOSPITAL | Age: 89
Setting detail: RADIATION/ONCOLOGY SERIES
Discharge: HOME | End: 2025-03-07
Payer: MEDICARE

## 2025-03-07 VITALS
DIASTOLIC BLOOD PRESSURE: 72 MMHG | HEART RATE: 84 BPM | BODY MASS INDEX: 29.77 KG/M2 | TEMPERATURE: 96.8 F | WEIGHT: 192.9 LBS | SYSTOLIC BLOOD PRESSURE: 123 MMHG | OXYGEN SATURATION: 98 % | RESPIRATION RATE: 18 BRPM

## 2025-03-07 DIAGNOSIS — D32.9 MENINGIOMA (MULTI): ICD-10-CM

## 2025-03-07 DIAGNOSIS — D36.10 SCHWANNOMA: ICD-10-CM

## 2025-03-07 PROCEDURE — G2211 COMPLEX E/M VISIT ADD ON: HCPCS | Performed by: INTERNAL MEDICINE

## 2025-03-07 PROCEDURE — 99214 OFFICE O/P EST MOD 30 MIN: CPT | Performed by: INTERNAL MEDICINE

## 2025-03-07 ASSESSMENT — ENCOUNTER SYMPTOMS
LIGHT-HEADEDNESS: 1
EXTREMITY WEAKNESS: 0
UNEXPECTED WEIGHT CHANGE: 0
ARTHRALGIAS: 1
HEADACHES: 1
FATIGUE: 0
SLEEP DISTURBANCE: 0
SEIZURES: 0
SORE THROAT: 0
DYSURIA: 0
OCCASIONAL FEELINGS OF UNSTEADINESS: 0
ABDOMINAL DISTENTION: 0
NUMBNESS: 1
HEMATURIA: 0
WHEEZING: 1
CONFUSION: 0
NAUSEA: 1
DIZZINESS: 0
CHILLS: 0
SHORTNESS OF BREATH: 1
BRUISES/BLEEDS EASILY: 1
BLOOD IN STOOL: 0
VOMITING: 0
DIFFICULTY URINATING: 1
DEPRESSION: 0
NERVOUS/ANXIOUS: 0
DIAPHORESIS: 0
WOUND: 0
EYE PROBLEMS: 1
DIARRHEA: 0
COUGH: 1
SPEECH DIFFICULTY: 0
FEVER: 0
DECREASED CONCENTRATION: 0
TROUBLE SWALLOWING: 0
CONSTIPATION: 1
APPETITE CHANGE: 1
LEG SWELLING: 1
FREQUENCY: 0
ABDOMINAL PAIN: 1
LOSS OF SENSATION IN FEET: 0

## 2025-03-07 ASSESSMENT — PATIENT HEALTH QUESTIONNAIRE - PHQ9
2. FEELING DOWN, DEPRESSED OR HOPELESS: NOT AT ALL
SUM OF ALL RESPONSES TO PHQ9 QUESTIONS 1 AND 2: 0
1. LITTLE INTEREST OR PLEASURE IN DOING THINGS: NOT AT ALL

## 2025-03-07 ASSESSMENT — COLUMBIA-SUICIDE SEVERITY RATING SCALE - C-SSRS
1. IN THE PAST MONTH, HAVE YOU WISHED YOU WERE DEAD OR WISHED YOU COULD GO TO SLEEP AND NOT WAKE UP?: NO
6. HAVE YOU EVER DONE ANYTHING, STARTED TO DO ANYTHING, OR PREPARED TO DO ANYTHING TO END YOUR LIFE?: NO
2. HAVE YOU ACTUALLY HAD ANY THOUGHTS OF KILLING YOURSELF?: NO

## 2025-03-07 NOTE — PROGRESS NOTES
Radiation Oncology Nursing Note    Pain: The patient's current pain level was assessed.  They report currently having a pain of 8 out of 10.  They feel their pain is not under control without the use of pain medications.  Sharp intense pain right side of skull; short and intense.     Review of Systems:  Review of Systems   Constitutional:  Positive for appetite change (lacks appetite). Negative for chills, diaphoresis, fatigue, fever and unexpected weight change.   HENT:   Positive for hearing loss and tinnitus (increased on left more than right). Negative for mouth sores, nosebleeds, sore throat and trouble swallowing.    Eyes:  Positive for eye problems (wears glasses).   Respiratory:  Positive for cough, shortness of breath and wheezing.         COPD   Cardiovascular:  Positive for chest pain (only with coughing spells) and leg swelling.   Gastrointestinal:  Positive for abdominal pain (abd cramps), constipation and nausea (occasional). Negative for abdominal distention, blood in stool, diarrhea and vomiting.   Genitourinary:  Positive for bladder incontinence and difficulty urinating (stop and starting). Negative for dyspareunia, dysuria, frequency, hematuria, pelvic pain and penile discharge.    Musculoskeletal:  Positive for arthralgias. Negative for gait problem.        Pains in legs; arthritis   Skin:  Positive for itching and rash (forearms). Negative for wound.   Neurological:  Positive for headaches, light-headedness (getting up quick ; standing up.) and numbness. Negative for dizziness, extremity weakness, gait problem, seizures and speech difficulty.   Hematological:  Bruises/bleeds easily.   Psychiatric/Behavioral:  Negative for confusion, decreased concentration, depression, sleep disturbance and suicidal ideas. The patient is not nervous/anxious.

## 2025-03-10 NOTE — PROGRESS NOTES
"AUDIOLOGY ADULT AUDIOMETRIC EVALUATION      Name:  Gregory Tai   :  1935  Age:  89 y.o.  Date of Evaluation:  3/13/2025    Time: ***-***    IMPRESSIONS     {Adult Impressions:96118}  {impressions wrs:26425}  {oaes (Optional):17405}  {tymps:19209}     Today's test results are {AUD IMPRESSIONS:02230}.     Amplification needs: {Amplification Needs:74548}    RECOMMENDATIONS     {Adult Recommendations:58357}    HISTORY     Reason for visit:  Mr. Tai is seen today for a follow-up audiologic evaluation in conjunction with an evaluation with Sherrie Downey MD due to known hearing loss. 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 Previous audio was performed on 21 and revealed  moderate to profound hearing loss on his right ear, severe to profound on his left ear.  History obtained from patient report and chart review.     Change in Hearing: {delvis y/n:35716}  Difficult listening environments: ***  Tinnitus: {delvis y/n:64937} {delvis tinnitus (Optional):88398}  Otalgia: {delvis y/n:66504::\"denied\"}  Aural Pressure/Fullness: {delvis y/n:88969::\"denied\"}  Recent Ear Infections/Illness: {delvis y/n:46416::\"denied\"}  Otorrhea: {delvis y/n:62345::\"denied\"}  Dizziness: {delvis y/n dizzy:56343::\"denied\"}  History of Ear Surgeries: {y/n surgeries:76514::\"denied\"}  History of Noise Exposure: {delvis noise exposure:34786::\"Denied\"}  Family History of Hearing Loss: {delvis fam hx hl:39212::\"Denied\"}  Hearing Aid Use: {delvis HA use:15659::\"none\"}  Other Significant History: {delvis y/n:94859::\"denied\"}  Falls within the last year: {delvis y/n falls:76221::\"denied\"}    EVALUATION     See scanned audiogram in \"Media\"     TEST RESULTS     Otoscopic Evaluation:  Right Ear: {otoscopy:97961}  Left Ear: {otoscopy:99206}    Tympanometry (226 Hz):  Right Ear: {tymp results:09728}.   Left Ear: {tymp results:32750}.     Acoustic Reflexes:   Right Ear: {delvis ART:52402}  Left Ear: {delvis " "ART:18434}    Distortion Product Otoacoustic Emissions:  Right Ear: {delvis DPOAEs:42215::\"Did not test.\"}  Left Ear:  {delvis DPOAEs:07325::\"Did not test.\"}  Present OAEs suggest normal or near cochlear outer hair cell function for corresponding frequency region(s). Absent OAEs with normal middle ear function can be consistent with some degree of hearing loss. Assessment of cochlear outer hair cell function may be impacted by outer or middle ear function.    Test technique:  Pure Tone Audiometry via {transducer:06542}  Reliability: {kmreliability:57605}    Pure Tone Audiometry:    Right Ear: {results:74296}  Left Ear: {results:40862}    Speech Audiometry:   Right Ear:  Speech Reception Threshold (SRT) was obtained at *** dB HL. Word Recognition scores were {DESC; EXCELLENT/GOOD/FAIR:26926} (***%) in quiet when words were presented at *** dB HL. These results are based on {Word List:61682}.   Left Ear:  Speech Reception Threshold (SRT) was obtained at *** dB HL. Word Recognition scores were {DESC; EXCELLENT/GOOD/FAIR:71075} (***%) in quiet when words were presented at *** dB HL. These results are based on {Word List:89602}.     Comparison of today's results with previous test results: *** since last evaluation on 12/8/21.    ***     PATIENT EDUCATION     Discussed results and recommendations with Mr. Tai{delvis results discussion (Optional):25021}. Questions were addressed and the patient was encouraged to contact our department at [phone] should concerns arise.       Karolina Buchanan, Mati, CCC-A  Clinical Audiologist    Degree of   Hearing Sensitivity dB Range   Within Normal Limits (WNL) 0 - 20   Slight 25   Mild 26 - 40   Moderate 41 - 55   Moderately-Severe 56 - 70   Severe 71 - 90   Profound 91 +     Key   CHL Conductive Hearing Loss   ECV Ear Canal Volume   HA Hearing Aid   NIHL Noise-Induced Hearing Loss   PTA Pure Tone Average   SNHL Sensorineural Hearing Loss   TM Tympanic Membrane   WNL Within Normal Limits     "

## 2025-03-11 ENCOUNTER — APPOINTMENT (OUTPATIENT)
Dept: PRIMARY CARE | Facility: CLINIC | Age: 89
End: 2025-03-11
Payer: MEDICARE

## 2025-03-13 ENCOUNTER — APPOINTMENT (OUTPATIENT)
Dept: AUDIOLOGY | Facility: CLINIC | Age: 89
End: 2025-03-13
Payer: MEDICARE

## 2025-03-13 ENCOUNTER — APPOINTMENT (OUTPATIENT)
Dept: OTOLARYNGOLOGY | Facility: CLINIC | Age: 89
End: 2025-03-13
Payer: MEDICARE

## 2025-04-01 ENCOUNTER — APPOINTMENT (OUTPATIENT)
Dept: CARDIOLOGY | Facility: HOSPITAL | Age: 89
End: 2025-04-01
Payer: MEDICARE

## 2025-05-08 ENCOUNTER — APPOINTMENT (OUTPATIENT)
Dept: VASCULAR MEDICINE | Facility: CLINIC | Age: 89
End: 2025-05-08
Payer: MEDICARE

## 2025-05-15 ENCOUNTER — APPOINTMENT (OUTPATIENT)
Dept: VASCULAR SURGERY | Facility: CLINIC | Age: 89
End: 2025-05-15
Payer: MEDICARE

## 2025-05-22 ENCOUNTER — APPOINTMENT (OUTPATIENT)
Dept: OPHTHALMOLOGY | Age: 89
End: 2025-05-22
Payer: MEDICARE

## 2025-07-24 ENCOUNTER — APPOINTMENT (OUTPATIENT)
Dept: AUDIOLOGY | Facility: CLINIC | Age: 89
End: 2025-07-24
Payer: MEDICARE

## 2025-07-24 ENCOUNTER — APPOINTMENT (OUTPATIENT)
Dept: OTOLARYNGOLOGY | Facility: CLINIC | Age: 89
End: 2025-07-24
Payer: MEDICARE

## 2025-09-04 ENCOUNTER — APPOINTMENT (OUTPATIENT)
Dept: HEMATOLOGY/ONCOLOGY | Facility: HOSPITAL | Age: 89
End: 2025-09-04
Payer: MEDICARE

## 2025-09-17 ENCOUNTER — APPOINTMENT (OUTPATIENT)
Dept: CARDIOLOGY | Facility: HOSPITAL | Age: 89
End: 2025-09-17
Payer: MEDICARE